# Patient Record
Sex: MALE | Race: WHITE | NOT HISPANIC OR LATINO | Employment: UNEMPLOYED | URBAN - METROPOLITAN AREA
[De-identification: names, ages, dates, MRNs, and addresses within clinical notes are randomized per-mention and may not be internally consistent; named-entity substitution may affect disease eponyms.]

---

## 2017-05-30 ENCOUNTER — AMBULATORY - RIVER FALLS (OUTPATIENT)
Dept: FAMILY MEDICINE | Facility: CLINIC | Age: 52
End: 2017-05-30

## 2017-11-02 ENCOUNTER — OFFICE VISIT - RIVER FALLS (OUTPATIENT)
Dept: FAMILY MEDICINE | Facility: CLINIC | Age: 52
End: 2017-11-02

## 2017-11-02 ASSESSMENT — MIFFLIN-ST. JEOR: SCORE: 1532.32

## 2017-11-20 ENCOUNTER — OFFICE VISIT - RIVER FALLS (OUTPATIENT)
Dept: FAMILY MEDICINE | Facility: CLINIC | Age: 52
End: 2017-11-20

## 2017-11-20 ASSESSMENT — MIFFLIN-ST. JEOR: SCORE: 1520.52

## 2018-03-08 ENCOUNTER — OFFICE VISIT - RIVER FALLS (OUTPATIENT)
Dept: FAMILY MEDICINE | Facility: CLINIC | Age: 53
End: 2018-03-08

## 2018-03-08 ASSESSMENT — MIFFLIN-ST. JEOR: SCORE: 1523.25

## 2019-10-09 ENCOUNTER — OFFICE VISIT - RIVER FALLS (OUTPATIENT)
Dept: FAMILY MEDICINE | Facility: CLINIC | Age: 54
End: 2019-10-09

## 2019-10-18 ENCOUNTER — OFFICE VISIT - RIVER FALLS (OUTPATIENT)
Dept: FAMILY MEDICINE | Facility: CLINIC | Age: 54
End: 2019-10-18

## 2019-10-18 ASSESSMENT — MIFFLIN-ST. JEOR: SCORE: 1449.76

## 2019-11-05 ENCOUNTER — OFFICE VISIT - RIVER FALLS (OUTPATIENT)
Dept: FAMILY MEDICINE | Facility: CLINIC | Age: 54
End: 2019-11-05

## 2019-11-05 ASSESSMENT — MIFFLIN-ST. JEOR: SCORE: 1457.02

## 2019-11-15 ENCOUNTER — COMMUNICATION - RIVER FALLS (OUTPATIENT)
Dept: FAMILY MEDICINE | Facility: CLINIC | Age: 54
End: 2019-11-15

## 2019-12-31 ENCOUNTER — OFFICE VISIT - RIVER FALLS (OUTPATIENT)
Dept: FAMILY MEDICINE | Facility: CLINIC | Age: 54
End: 2019-12-31

## 2019-12-31 ASSESSMENT — MIFFLIN-ST. JEOR: SCORE: 1494.21

## 2020-01-09 ENCOUNTER — OFFICE VISIT - RIVER FALLS (OUTPATIENT)
Dept: FAMILY MEDICINE | Facility: CLINIC | Age: 55
End: 2020-01-09

## 2020-01-09 ASSESSMENT — MIFFLIN-ST. JEOR: SCORE: 1505.1

## 2020-03-05 ENCOUNTER — OFFICE VISIT - RIVER FALLS (OUTPATIENT)
Dept: FAMILY MEDICINE | Facility: CLINIC | Age: 55
End: 2020-03-05

## 2020-03-05 ASSESSMENT — MIFFLIN-ST. JEOR: SCORE: 1523.25

## 2020-03-06 ENCOUNTER — COMMUNICATION - RIVER FALLS (OUTPATIENT)
Dept: FAMILY MEDICINE | Facility: CLINIC | Age: 55
End: 2020-03-06

## 2020-03-06 LAB
BUN SERPL-MCNC: 16 MG/DL (ref 7–25)
BUN/CREAT RATIO - HISTORICAL: ABNORMAL (ref 6–22)
CALCIUM SERPL-MCNC: 9.8 MG/DL (ref 8.6–10.3)
CHLORIDE BLD-SCNC: 103 MMOL/L (ref 98–110)
CO2 SERPL-SCNC: 31 MMOL/L (ref 20–32)
CREAT SERPL-MCNC: 0.77 MG/DL (ref 0.7–1.33)
EGFRCR SERPLBLD CKD-EPI 2021: 104 ML/MIN/1.73M2
GLUCOSE BLD-MCNC: 121 MG/DL (ref 65–99)
POTASSIUM BLD-SCNC: 4.7 MMOL/L (ref 3.5–5.3)
SODIUM SERPL-SCNC: 141 MMOL/L (ref 135–146)

## 2020-05-14 ENCOUNTER — OFFICE VISIT - RIVER FALLS (OUTPATIENT)
Dept: FAMILY MEDICINE | Facility: CLINIC | Age: 55
End: 2020-05-14

## 2020-05-15 ENCOUNTER — COMMUNICATION - RIVER FALLS (OUTPATIENT)
Dept: FAMILY MEDICINE | Facility: CLINIC | Age: 55
End: 2020-05-15

## 2020-09-08 ENCOUNTER — COMMUNICATION - RIVER FALLS (OUTPATIENT)
Dept: FAMILY MEDICINE | Facility: CLINIC | Age: 55
End: 2020-09-08

## 2020-09-11 ENCOUNTER — OFFICE VISIT - RIVER FALLS (OUTPATIENT)
Dept: FAMILY MEDICINE | Facility: CLINIC | Age: 55
End: 2020-09-11

## 2020-10-23 ENCOUNTER — OFFICE VISIT - RIVER FALLS (OUTPATIENT)
Dept: FAMILY MEDICINE | Facility: CLINIC | Age: 55
End: 2020-10-23

## 2020-10-23 ASSESSMENT — MIFFLIN-ST. JEOR: SCORE: 1527.78

## 2020-11-23 ENCOUNTER — COMMUNICATION - RIVER FALLS (OUTPATIENT)
Dept: FAMILY MEDICINE | Facility: CLINIC | Age: 55
End: 2020-11-23

## 2021-02-04 ENCOUNTER — OFFICE VISIT - RIVER FALLS (OUTPATIENT)
Dept: FAMILY MEDICINE | Facility: CLINIC | Age: 56
End: 2021-02-04

## 2021-02-18 ENCOUNTER — OFFICE VISIT - RIVER FALLS (OUTPATIENT)
Dept: FAMILY MEDICINE | Facility: CLINIC | Age: 56
End: 2021-02-18

## 2021-02-18 ASSESSMENT — MIFFLIN-ST. JEOR: SCORE: 1536.85

## 2021-02-19 ENCOUNTER — COMMUNICATION - RIVER FALLS (OUTPATIENT)
Dept: FAMILY MEDICINE | Facility: CLINIC | Age: 56
End: 2021-02-19

## 2021-02-19 LAB
BUN SERPL-MCNC: 23 MG/DL (ref 7–25)
BUN/CREAT RATIO - HISTORICAL: NORMAL (ref 6–22)
CALCIUM SERPL-MCNC: 9.3 MG/DL (ref 8.6–10.3)
CHLORIDE BLD-SCNC: 104 MMOL/L (ref 98–110)
CO2 SERPL-SCNC: 29 MMOL/L (ref 20–32)
CREAT SERPL-MCNC: 1.11 MG/DL (ref 0.7–1.33)
EGFRCR SERPLBLD CKD-EPI 2021: 75 ML/MIN/1.73M2
GLUCOSE BLD-MCNC: 89 MG/DL (ref 65–99)
POTASSIUM BLD-SCNC: 4.9 MMOL/L (ref 3.5–5.3)
SODIUM SERPL-SCNC: 139 MMOL/L (ref 135–146)

## 2021-06-18 ENCOUNTER — OFFICE VISIT - RIVER FALLS (OUTPATIENT)
Dept: FAMILY MEDICINE | Facility: CLINIC | Age: 56
End: 2021-06-18

## 2021-06-18 ASSESSMENT — MIFFLIN-ST. JEOR: SCORE: 1527.78

## 2021-10-12 ENCOUNTER — OFFICE VISIT - RIVER FALLS (OUTPATIENT)
Dept: FAMILY MEDICINE | Facility: CLINIC | Age: 56
End: 2021-10-12

## 2021-10-22 ENCOUNTER — OFFICE VISIT - RIVER FALLS (OUTPATIENT)
Dept: FAMILY MEDICINE | Facility: CLINIC | Age: 56
End: 2021-10-22

## 2021-10-22 ASSESSMENT — MIFFLIN-ST. JEOR: SCORE: 1547.29

## 2022-01-28 ENCOUNTER — OFFICE VISIT - RIVER FALLS (OUTPATIENT)
Dept: FAMILY MEDICINE | Facility: CLINIC | Age: 57
End: 2022-01-28

## 2022-02-11 VITALS
RESPIRATION RATE: 16 BRPM | SYSTOLIC BLOOD PRESSURE: 136 MMHG | HEIGHT: 66 IN | HEART RATE: 100 BPM | DIASTOLIC BLOOD PRESSURE: 84 MMHG | TEMPERATURE: 98 F | WEIGHT: 168 LBS | BODY MASS INDEX: 27 KG/M2

## 2022-02-12 VITALS
SYSTOLIC BLOOD PRESSURE: 142 MMHG | SYSTOLIC BLOOD PRESSURE: 128 MMHG | HEIGHT: 66 IN | WEIGHT: 148.8 LBS | OXYGEN SATURATION: 98 % | HEART RATE: 102 BPM | DIASTOLIC BLOOD PRESSURE: 90 MMHG | TEMPERATURE: 98.9 F | DIASTOLIC BLOOD PRESSURE: 80 MMHG | DIASTOLIC BLOOD PRESSURE: 80 MMHG | HEART RATE: 100 BPM | WEIGHT: 147.6 LBS | OXYGEN SATURATION: 98 % | BODY MASS INDEX: 23.91 KG/M2 | BODY MASS INDEX: 24.17 KG/M2 | HEART RATE: 88 BPM | HEIGHT: 66 IN | WEIGHT: 150.4 LBS | SYSTOLIC BLOOD PRESSURE: 160 MMHG | BODY MASS INDEX: 24.19 KG/M2

## 2022-02-12 VITALS
SYSTOLIC BLOOD PRESSURE: 136 MMHG | HEART RATE: 100 BPM | BODY MASS INDEX: 27.04 KG/M2 | WEIGHT: 165 LBS | TEMPERATURE: 97 F | OXYGEN SATURATION: 99 % | HEIGHT: 66 IN | BODY MASS INDEX: 26.52 KG/M2 | HEIGHT: 66 IN | DIASTOLIC BLOOD PRESSURE: 84 MMHG

## 2022-02-12 VITALS
DIASTOLIC BLOOD PRESSURE: 84 MMHG | BODY MASS INDEX: 26.42 KG/M2 | OXYGEN SATURATION: 97 % | HEART RATE: 116 BPM | SYSTOLIC BLOOD PRESSURE: 152 MMHG | BODY MASS INDEX: 26.84 KG/M2 | SYSTOLIC BLOOD PRESSURE: 148 MMHG | HEART RATE: 108 BPM | HEIGHT: 66 IN | DIASTOLIC BLOOD PRESSURE: 90 MMHG | HEIGHT: 66 IN | TEMPERATURE: 98.4 F | OXYGEN SATURATION: 96 % | WEIGHT: 164.4 LBS | WEIGHT: 167 LBS

## 2022-02-12 VITALS
BODY MASS INDEX: 25.88 KG/M2 | TEMPERATURE: 97.7 F | BODY MASS INDEX: 25.49 KG/M2 | HEIGHT: 66 IN | WEIGHT: 161 LBS | TEMPERATURE: 98.8 F | DIASTOLIC BLOOD PRESSURE: 90 MMHG | WEIGHT: 158.6 LBS | DIASTOLIC BLOOD PRESSURE: 86 MMHG | SYSTOLIC BLOOD PRESSURE: 148 MMHG | HEART RATE: 100 BPM | HEART RATE: 77 BPM | SYSTOLIC BLOOD PRESSURE: 150 MMHG | OXYGEN SATURATION: 99 % | HEIGHT: 66 IN

## 2022-02-12 VITALS
DIASTOLIC BLOOD PRESSURE: 84 MMHG | BODY MASS INDEX: 27.37 KG/M2 | HEIGHT: 66 IN | WEIGHT: 170.3 LBS | SYSTOLIC BLOOD PRESSURE: 136 MMHG

## 2022-02-12 VITALS
HEART RATE: 93 BPM | TEMPERATURE: 97.9 F | HEIGHT: 66 IN | OXYGEN SATURATION: 98 % | WEIGHT: 166 LBS | BODY MASS INDEX: 26.68 KG/M2 | SYSTOLIC BLOOD PRESSURE: 136 MMHG | DIASTOLIC BLOOD PRESSURE: 84 MMHG

## 2022-02-12 VITALS
WEIGHT: 166 LBS | HEIGHT: 66 IN | BODY MASS INDEX: 26.68 KG/M2 | OXYGEN SATURATION: 98 % | DIASTOLIC BLOOD PRESSURE: 84 MMHG | BODY MASS INDEX: 26.55 KG/M2 | SYSTOLIC BLOOD PRESSURE: 136 MMHG | HEART RATE: 105 BPM | TEMPERATURE: 97.7 F | DIASTOLIC BLOOD PRESSURE: 82 MMHG | SYSTOLIC BLOOD PRESSURE: 136 MMHG | TEMPERATURE: 97.8 F | WEIGHT: 162 LBS | HEART RATE: 88 BPM

## 2022-02-12 VITALS
SYSTOLIC BLOOD PRESSURE: 164 MMHG | HEIGHT: 66 IN | TEMPERATURE: 97.6 F | BODY MASS INDEX: 26.52 KG/M2 | HEART RATE: 108 BPM | DIASTOLIC BLOOD PRESSURE: 92 MMHG | WEIGHT: 165 LBS

## 2022-02-15 NOTE — TELEPHONE ENCOUNTER
---------------------  From: Isabell Bronson CMA (eRx Pool ([id number] [facility] [place]))   To: Jose Dela Cruz MD;     Sent: 8/7/2020 2:45:29 PM CDT  Subject: FW: Medication Management   Due Date/Time: 8/8/2020 2:43:00 PM CDT           ------------------------------------------  From: [facility]  To: Jose Dela Cruz MD  Sent: August 7, 2020 2:43:51 PM CDT  Subject: Medication Management  Due: July 28, 2020 10:29:52 PM CDT     ** On Hold Pending Signature **     Drug: ibuprofen (ibuprofen 800 mg oral tablet), TAKE ONE TABLET BY MOUTH EVERY 8 HOURS AS DIRECTED BY PROVIDER  Quantity: 90 unknown unit  Days Supply: 30  Refills: 1  Substitutions Allowed  Notes from Pharmacy:     Dispensed Drug: ibuprofen (ibuprofen 800 mg oral tablet), TAKE ONE TABLET BY MOUTH EVERY 8 HOURS AS DIRECTED BY PROVIDER  Quantity: 90 unknown unit  Days Supply: 30  Refills: 1  Substitutions Allowed  Notes from Pharmacy:  ---------------------------------------------------------------  From: Jose Dela Cruz MD   To: [facility]    Sent: 8/7/2020 3:44:38 PM CDT  Subject: FW: Medication Management     ** Approved with modifications: **  ibuprofen (IBUPROFEN 800MG TABS)  TAKE ONE TABLET BY MOUTH EVERY 8 HOURS AS DIRECTED BY PROVIDER  Qty:  90 unknown unit        Days Supply:  30        Refills:  1          Substitutions Allowed     Route To Pharmacy - [facility]

## 2022-02-15 NOTE — PROGRESS NOTES
Patient:   DERICK ADAMS            MRN: [id number]            FIN: [id number]               Age:   52 years     Sex:  Male     :  1965   Associated Diagnoses:   None   Author:   Jose Dela Cruz MD       -   Today's date:  3/8/2018 4:09:00 PM .        -   To whom it may concern:        This patient is currently under my care and Was seen in my office on  3/8/2018.  .     Please excuse him/ her from work, until back further evaluated by orthopedics.  Please contact me if you have any questions or concerns.      -   Sincerely,             Jose Dela Cruz MD  [address]  [id number] [phone]

## 2022-02-15 NOTE — TELEPHONE ENCOUNTER
---------------------  From: Phyllis Posada CMA   Sent: 2/4/2021 2:38:41 PM CST  Subject: Tele/Today's appt     Attempted to call patient w/ no answer to see if he would like to discuss chronic pain over the phone instead of face to face due to weather.

## 2022-02-15 NOTE — TELEPHONE ENCOUNTER
---------------------  From: Iraida Phillip RN (Phone Messages Pool ([id number]))   To: [facility] Message Pool ([id number]);     Sent: 9/8/2020 10:44:07 AM CDT  Subject: LTD forms     Phone message    PCP:   CHT      Time of Call:  1025       Person Calling:  Female  Phone number:  [phone]    Returned call at: _    Note:    states T was supposed to be getting LTD forms to fill out and fax back. They are wondering if this has been done.    Please advise.    Last office visit and reason:  5/14/20, low back pain---------------------  From: Phyllis Posada CMA (T Message Pool ([id number]))   To: Jose Dela Cruz MD;     Sent: 9/8/2020 11:29:04 AM CDT  Subject: FW: LTD forms---------------------  From: Jose Dela Cruz MD   To: [facility] Message Pool ([id number]_[facility]);     Sent: 9/8/2020 11:30:17 AM CDT  Subject: RE: LTD forms     Do not have and will require a visit to fill out forms.Patient informed to obtain a copy of ppwk and make appt for completion. Pt agreed and will call back

## 2022-02-15 NOTE — NURSING NOTE
Comprehensive Intake Entered On:  6/18/2021 11:32 AM CDT    Performed On:  6/18/2021 11:29 AM CDT by Olivia Razo CMA               Summary   Chief Complaint :   Low back pain f/u. Needs Oxycodone refill.    Menstrual Status :   N/A   Weight Measured :   166 lb(Converted to: 166 lb 0 oz, 75.296 kg)    Height Measured :   65.5 in(Converted to: 5 ft 5 in, 166.37 cm)    Body Mass Index :   27.2 kg/m2 (HI)    Body Surface Area :   1.86 m2   Height/Length Estimated :   65.5 in(Converted to: 5 ft 5 in, 166.37 cm)    Systolic Blood Pressure :   128 mmHg   Diastolic Blood Pressure :   80 mmHg   Mean Arterial Pressure :   96 mmHg   Peripheral Pulse Rate :   93 bpm   BP Site :   Right arm   BP Method :   Electronic   Temperature Oral :   97.9 DegF(Converted to: 36.6 DegC)    Oxygen Saturation :   98 %   Olivia Razo CMA - 6/18/2021 11:29 AM CDT   Health Status   Allergies Verified? :   Yes   Medication History Verified? :   Yes   Pre-Visit Planning Status :   Completed   Tobacco Use? :   Current every day smoker   Olivia Razo CMA - 6/18/2021 11:29 AM CDT   Consents   Consent for Immunization Exchange :   Consent Granted   Consent for Immunizations to Providers :   Consent Granted   Olivia Razo CMA - 6/18/2021 11:29 AM CDT   Meds / Allergies   (As Of: 6/18/2021 11:32:43 AM CDT)   Allergies (Active)   penicillin  Estimated Onset Date:   Unspecified ; Created By:   Michelle Bonilla; Reaction Status:   Active ; Category:   Drug ; Substance:   penicillin ; Type:   Allergy ; Updated By:   Michelle Bonilla; Reviewed Date:   6/18/2021 11:30 AM CDT        Medication List   (As Of: 6/18/2021 11:32:43 AM CDT)   Prescription/Discharge Order    baclofen  :   baclofen ; Status:   Prescribed ; Ordered As Mnemonic:   baclofen 10 mg oral tablet ; Simple Display Line:   10 mg, 1 tab(s), Oral, tid, PRN: as needed for muscle spasm, 90 tab(s), 1 Refill(s) ; Ordering Provider:   Jose Dela Cruz MD; Catalog Code:   baclofen ; Order Dt/Tm:    5/15/2020 4:06:42 PM CDT          ibuprofen  :   ibuprofen ; Status:   Prescribed ; Ordered As Mnemonic:   ibuprofen 800 mg oral tablet ; Simple Display Line:   1 tab(s), Oral, q8 hrs, for 30 day(s), AS DIRECTED BY PROVIDER., 90 tab(s), 5 Refill(s) ; Ordering Provider:   Jose Dela Cruz MD; Catalog Code:   ibuprofen ; Order Dt/Tm:   2/18/2021 4:49:48 PM CST          oxyCODONE  :   oxyCODONE ; Status:   Prescribed ; Ordered As Mnemonic:   oxyCODONE 5 mg oral tablet ; Simple Display Line:   5 mg, 1 tab(s), Oral, q12 hrs, 15 tab(s), 0 Refill(s) ; Ordering Provider:   Jose Dela Cruz MD; Catalog Code:   oxyCODONE ; Order Dt/Tm:   2/18/2021 4:46:48 PM CST

## 2022-02-15 NOTE — PROGRESS NOTES
Patient:   DERICK ADAMS            MRN: [id number]            FIN: [id number]               Age:   52 years     Sex:  Male     :  1965   Associated Diagnoses:   Lumbar compression fracture   Author:   Narendra Cisneros PA-C      Report Summary   Diagnosis  Lumbar compression fracture (VBC04-AX S32.040A).  Patient InstructionsOrders   Visit Information      Date of Service: 2017 07:38 am  Performing Location: [facility]  Encounter#: [id number]      Primary Care Provider (PCP):  Jose Dela Cruz MD    NPI# [id number]      Chief Complaint   2017 8:03 AM CST   Patient here for ER follow up. Patient in ER over the weekend, and has a fractured L4. Pain meds prescribed not helping.        History of Present Illness             The patient presents with back pain.  The back pain is localized and lumbar region.  The back pain is described as burning.  The severity of the back pain is moderate.  The back pain is constant.  The back pain has lasted for 2 day(s).  Jumped off porch of [employer] building and hurt lower back. Was to [facility] ED and brings in records. Has L4 compression fracture. ?s about follow up. Has tramadol which isn't helping with pain. PDMP checked. Recent negative drug screen. CC above noted and confirmed with the patient. No bowel or bladder complaints. Numbness in right leg..        Review of Systems   Constitutional:  Negative.    Musculoskeletal:  Negative except as documented in history of present illness.    Integumentary:  Negative.    Neurologic:  Negative except as documented in history of present illness.       Health Status   Allergies:    Allergic Reactions (All)  Severity Not Documented  Penicillin (No reactions were documented)   Medications:  (Selected)   Prescriptions  Prescribed  Tylenol with Codeine #3 oral tablet: 1 tab(s), PO, q4hr, PRN: for pain, # 18 tab(s), 0 Refill(s), Type: Maintenance, Pharmacy: [facility], 1 tab(s) po q4 hrs,PRN:for  pain  Documented Medications  Documented  traMADol 50 mg oral tablet: 1 tab(s) ( 50 mg ), PO, q4hr, PRN: for pain, # 60 tab(s), 0 Refill(s), Type: Maintenance   Problem list:    All Problems  Tobacco abuse / SNOMED CT [id number] / Confirmed      Histories   Past Medical History:    Active  Tobacco abuse ([id number])   Family History:    No family history items have been selected or recorded.   Procedure history:    No active procedure history items have been selected or recorded.   Social History:        Alcohol Assessment: Denies Alcohol Use            Never      Tobacco Assessment: Current            Cigarettes, 20 per day.      Substance Abuse Assessment: Denies Substance Abuse            Never      Employment and Education Assessment            Work/School description: Construction.      Home and Environment Assessment            Marital status: Single.  Spouse/Partner name: Carito Noland.      Nutrition and Health Assessment            Type of diet: Regular.      Exercise and Physical Activity Assessment: Does not exercise            Exercise frequency: Physical job.      Sexual Assessment            Sexually active: Yes.  Sexual orientation: Heterosexual.        Physical Examination   Vital Signs   11/20/2017 8:03 AM CST Peripheral Pulse Rate 108 bpm  HI    Systolic Blood Pressure 152 mmHg  HI    Diastolic Blood Pressure 90 mmHg    Mean Arterial Pressure 111 mmHg    Oxygen Saturation 96 %      General:  Mild distress.    Respiratory:  Respirations are non-labored.    Cardiovascular:  Normal rate.    Musculoskeletal:  Normal gait.    Integumentary:  No rash.    Neurologic:  No focal deficits.       Impression and Plan   Diagnosis     Lumbar compression fracture ([id number]-NU S32.040A).     Patient Instructions:       Counseled: Patient, Regarding diagnosis, Regarding medications, Activity, Verbalized understanding.    Orders     Orders (Selected)   Outpatient Orders  Ordered  Referral (Request): 11/20/17 9:28:00  CST, Referred to: Orthopaedics, Referred to: First available, Reason for referral: lumbar compression fracture, Priority: Soon, Lumbar compression fracture  Prescriptions  Prescribed  Tylenol with Codeine #3 oral tablet: 1 tab(s), PO, q4hr, PRN: for pain, # 18 tab(s), 0 Refill(s), Type: Maintenance, Pharmacy: [facility], 1 tab(s) po q4 hrs,PRN:for pain.     Note given for work.

## 2022-02-15 NOTE — LETTER
(Inserted Image. Unable to display)                                                                                                [address] [facility] [address]                                                March 03, 2021Re: DERICK ADAMS1965Tregavin Emerson MD[facility] - [facility]ist/[address] [place], [place]  [address]Pi: Dr. EmersonThe following patient has been referred to your office/practice:  DERICKANSELMO NUNEZZIER Appointment:  Appointment is PendingLocation:  [place] refer to the attached  clinical documentation for a summary of DERICK's care.  Please do not hesitate to contact our office if any additional clinical questions arise.  All relevant records and transition of care documents should be mailed or faxed.Your assistance in providing continuity of care is appreciated. Sincerely, [facility] [place] [address](P) [phone](F) [phone]

## 2022-02-15 NOTE — NURSING NOTE
Comprehensive Intake Entered On:  10/22/2021 4:19 PM CDT    Performed On:  10/22/2021 4:12 PM CDT by Phyllis Posada CMA               Summary   Chief Complaint :   Low back pain follow up. Medication refills.   Menstrual Status :   N/A   Weight Measured :   170.3 lb(Converted to: 170 lb 5 oz, 77.247 kg)    Height Measured :   65.5 in(Converted to: 5 ft 5 in, 166.37 cm)    Body Mass Index :   27.91 kg/m2 (HI)    Body Surface Area :   1.89 m2   Height/Length Estimated :   65.5 in(Converted to: 5 ft 5 in, 166.37 cm)    Systolic Blood Pressure :   126 mmHg   Diastolic Blood Pressure :   82 mmHg (HI)    Mean Arterial Pressure :   97 mmHg   BP Site :   Right arm   BP Method :   Manual   Phyllis Posada CMA - 10/22/2021 4:12 PM CDT   Health Status   Allergies Verified? :   Yes   Medication History Verified? :   Yes   Medical History Verified? :   Yes   Pre-Visit Planning Status :   Completed   Tobacco Use? :   Current every day smoker   Phyllis Posada CMA - 10/22/2021 4:12 PM CDT   Consents   Consent for Immunization Exchange :   Consent Granted   Consent for Immunizations to Providers :   Consent Granted   Phyllis Posada CMA - 10/22/2021 4:12 PM CDT   Meds / Allergies   (As Of: 10/22/2021 4:20:00 PM CDT)   Allergies (Active)   penicillin  Estimated Onset Date:   Unspecified ; Created By:   Michelle Bonilla; Reaction Status:   Active ; Category:   Drug ; Substance:   penicillin ; Type:   Allergy ; Updated By:   Michelle Bonilla; Reviewed Date:   10/22/2021 4:17 PM CDT        Medication List   (As Of: 10/22/2021 4:20:00 PM CDT)   Prescription/Discharge Order    baclofen  :   baclofen ; Status:   Prescribed ; Ordered As Mnemonic:   baclofen 10 mg oral tablet ; Simple Display Line:   10 mg, 1 tab(s), Oral, tid, PRN: as needed for muscle spasm, 90 tab(s), 1 Refill(s) ; Ordering Provider:   Jose Dela Cruz MD; Catalog Code:   baclofen ; Order Dt/Tm:   6/18/2021 11:52:38 AM CDT          ibuprofen  :   ibuprofen ; Status:    Prescribed ; Ordered As Mnemonic:   ibuprofen 800 mg oral tablet ; Simple Display Line:   1 tab(s), Oral, q8 hrs, for 30 day(s), AS DIRECTED BY PROVIDER., 90 tab(s), 5 Refill(s) ; Ordering Provider:   Jose Dela Cruz MD; Catalog Code:   ibuprofen ; Order Dt/Tm:   6/18/2021 11:52:38 AM CDT          oxyCODONE  :   oxyCODONE ; Status:   Prescribed ; Ordered As Mnemonic:   oxyCODONE 5 mg oral tablet ; Simple Display Line:   5 mg, 1 tab(s), Oral, q12 hrs, 15 tab(s), 0 Refill(s) ; Ordering Provider:   Jose Dela Cruz MD; Catalog Code:   oxyCODONE ; Order Dt/Tm:   6/18/2021 11:52:37 AM CDT

## 2022-02-15 NOTE — NURSING NOTE
Comprehensive Intake Entered On:  9/11/2020 1:06 PM CDT    Performed On:  9/11/2020 1:02 PM CDT by Phyllis Posada CMA               Summary   Chief Complaint :   Disability paperwork per Principal   Menstrual Status :   N/A   Weight Measured :   162 lb(Converted to: 162 lb 0 oz, 73.48 kg)    Height/Length Estimated :   65.5 in(Converted to: 5 ft 5 in, 166.37 cm)    Systolic Blood Pressure :   160 mmHg (HI)    Diastolic Blood Pressure :   96 mmHg (HI)    Mean Arterial Pressure :   117 mmHg   Peripheral Pulse Rate :   88 bpm   BP Site :   Right arm   BP Method :   Electronic   Temperature Tympanic :   97.7 DegF(Converted to: 36.5 DegC)  (LOW)    Phyllis Posada CMA - 9/11/2020 1:02 PM CDT   Health Status   Allergies Verified? :   Yes   Medication History Verified? :   Yes   Medical History Verified? :   Yes   Pre-Visit Planning Status :   Completed   Tobacco Use? :   Current every day smoker   Phyllis Posada CMA - 9/11/2020 1:02 PM CDT   Consents   Consent for Immunization Exchange :   Consent Granted   Consent for Immunizations to Providers :   Consent Granted   Phyllis Posada CMA - 9/11/2020 1:02 PM CDT   Meds / Allergies   (As Of: 9/11/2020 1:06:42 PM CDT)   Allergies (Active)   penicillin  Estimated Onset Date:   Unspecified ; Created By:   Michelle Vázquez; Reaction Status:   Active ; Category:   Drug ; Substance:   penicillin ; Type:   Allergy ; Updated By:   Michelle Vázquez; Reviewed Date:   9/11/2020 1:04 PM CDT        Medication List   (As Of: 9/11/2020 1:06:42 PM CDT)   Prescription/Discharge Order    baclofen  :   baclofen ; Status:   Prescribed ; Ordered As Mnemonic:   baclofen 10 mg oral tablet ; Simple Display Line:   10 mg, 1 tab(s), Oral, tid, PRN: as needed for muscle spasm, 90 tab(s), 1 Refill(s) ; Ordering Provider:   Jose Dela Cruz MD; Catalog Code:   baclofen ; Order Dt/Tm:   5/15/2020 4:06:42 PM CDT          ibuprofen  :   ibuprofen ; Status:   Prescribed ; Ordered As Mnemonic:   ibuprofen 800 mg  oral tablet ; Simple Display Line:   1 tab(s), Oral, q8 hrs, AS DIRECTED BY PROVIDER., 90 unknown unit, 1 Refill(s) ; Ordering Provider:   Jose Dela Cruz MD; Catalog Code:   ibuprofen ; Order Dt/Tm:   8/7/2020 3:44:33 PM CDT            Home Meds    oxyCODONE  :   oxyCODONE ; Status:   Documented ; Ordered As Mnemonic:   oxyCODONE 5 mg oral tablet ; Simple Display Line:   5 mg, 1 tab(s), Oral, q6 hrs, PRN: for pain, 0 Refill(s) ; Catalog Code:   oxyCODONE ; Order Dt/Tm:   9/11/2020 1:04:37 PM CDT            ID Risk Screen   Recent Travel History :   No recent travel   Family Member Travel History :   No recent travel   Other Exposure to Infectious Disease :   Unknown   Phyllis Posada CMA - 9/11/2020 1:02 PM CDT   More Vitals   Systolic Blood Pressure Repeat :   136 mmHg   Diastolic Blood Pressure Repeat :   82 mmHg   Phyllis Posada CMA - 9/11/2020 2:06 PM CDT

## 2022-02-15 NOTE — PROGRESS NOTES
Chief Complaint    Pt c/o left ear pain.  History of Present Illness      Chief complaint as above reviewed and confirmed with patient.  Pt presents to the clinic with concerns re: L otorrhea and ear discomfort.  tobacco using male, has a hx of ear infections, PE tubes as a child and adult. Has been several years since last set.  Has several episodes of otorrhea and ear pain per year he does not seek medical care for, however this one is worse.  No fevers.  Does not recall last time someone looked in his ear when he did not have pain or otorrhea. no fever or uri recently.  Review of Systems      Review of systems is negative with the exception of those noted in HPI          Physical Exam   Vitals & Measurements    T: 97.8  F (Tympanic)  HR: 105 (Peripheral)  BP: 136/82  SpO2: 98%     HT: 65.5 in  HT: 65.5 in  WT: 166 lb  BMI: 27.2       nad appears well.  exam of the L ear revals thin purulent otorrhea present. unable to see TM.  No blood.       R TM intact, retracted and with purulent air fluid level and mild erythema.       neck supple no adenopathy  Assessment/Plan       1. Tobacco abuse (Z72.0)         cessation recommended       2. Otitis media of right ear (H66.91)         cefdinir as ordered given allergies       3. Otorrhea, left ear (H92.12)         floxin as ordered.  recheck in 3 -4 weeks.  If persistent consider ENT referral given history and tobacco use.       Orders:         cefdinir, = 1 cap(s) ( 300 mg ), Oral, q12 hrs, x 10 day(s), # 20 cap(s), 0 Refill(s), Type: Acute, Pharmacy: [facility], 1 cap(s) Oral q12 hrs,x10 day(s), 65.5, in, 10/23/20 13:05:00 CDT, Height Measured, 166, lb, 10/23/20 13:05:00 CDT..., (Ordered)         ofloxacin otic, 5 drop(s), Ear-Left, bid, # 10 mL, 0 Refill(s), Type: Acute, Pharmacy: [facility], 5 drop(s) Ear-Left bid, 65.5, in, 10/23/20 13:05:00 CDT, Height Measured, 166, lb, 10/23/20 13:05:00 CDT, Weight Measured,  (Ordered)  Patient Information     Name:DERICK ADAMS      Address:      [address] [place] [phone]     Sex:Male     YOB: 1965     [phone]     Emergency Contact:CARITO CARTER     MRN:[id number]     FIN:[id number]     Location:[facility]     Date of Service:10/23/2020      Primary Care Physician:       Jose Dela Cruz MD, [phone]      Attending Physician:       Willow ROCHA, Vesna COLEMAN, [phone]  Problem List/Past Medical History    Ongoing     Tobacco abuse    Historical     No qualifying data  Medications    baclofen 10 mg oral tablet, 10 mg= 1 tab(s), Oral, tid, PRN, 1 refills    cefdinir 300 mg oral capsule, 300 mg= 1 cap(s), Oral, q12 hrs    Floxin Otic 0.3% otic solution, 5 drop(s), Ear-Left, bid    ibuprofen 800 mg oral tablet, 1 tab(s), Oral, q8 hrs    oxyCODONE 5 mg oral capsule, 5 mg= 1 cap(s), Oral, q4 hrs  Allergies    penicillin  Social History    Smoking Status     Never smoker     Alcohol - Denies Alcohol Use      Never     Employment/School      Work/School description: Construction.     Exercise - Does not exercise      Exercise frequency: Physical job.     Home/Environment      Marital status: Single. Spouse/Partner name: Carito Noland.     Nutrition/Health      Type of diet: Regular.     Sexual      Sexually active: Yes. Sexual orientation: Heterosexual.     Substance Abuse - Denies Substance Abuse      Never     Tobacco - Current      Cigarettes, 20 per day.

## 2022-02-15 NOTE — TELEPHONE ENCOUNTER
Per patient needs to be re-referred for spine, nothing scheduled after referral sent in May. Referral infomation faxed to [facility], LM for patient that they will contact him to schedule and left my number to call if does not hear from them within a week.Communication received from [facility] stating patient has not returned calls attempting to arrange referral.

## 2022-02-15 NOTE — PROGRESS NOTES
Patient:   DERICK ADAMS            MRN: [id number]            FIN: [id number]               Age:   55 years     Sex:  Male     :  1965   Associated Diagnoses:   Low back pain   Author:   Jose Dela Cruz MD      Visit Information      Date of Service: 2021 11:20 am  Performing Location: [facility]  Encounter#: [id number]      Primary Care Provider (PCP):  Jose Dela Cruz MD    NPI# [id number]      Referring Provider:  Jose Dela Cruz MD    NPI# [id number]      Chief Complaint   2021 11:29 AM CDT   Low back pain f/u. Needs Oxycodone refill.   Chief complaint and symptoms noted above confirmed with patient.      Review of Systems   Constitutional:  Negative.    Musculoskeletal:  Negative except as documented in history of present illness.       Health Status   Allergies:    Allergic Reactions (Selected)  Severity Not Documented  Penicillin (No reactions were documented)   Medications:  (Selected)   Prescriptions  Prescribed  baclofen 10 mg oral tablet: = 1 tab(s) ( 10 mg ), Oral, tid, PRN: as needed for muscle spasm, # 90 tab(s), 1 Refill(s), Type: Maintenance, Pharmacy: [facility], 1 tab(s) Oral tid,PRN:as needed for muscle spasm, 65.5, in, 21 11:29:00 CDT, Height Kia...  ibuprofen 800 mg oral tablet: = 1 tab(s), Oral, q8 hrs, x 30 day(s), Instructions: AS DIRECTED BY PROVIDER., # 90 tab(s), 5 Refill(s), Type: Acute, Pharmacy: [facility], 1 tab(s) Oral q8 hrs,x30 day(s),Instr:AS DIRECTED BY PROVIDER., 65.5, in, 21 11:...  oxyCODONE 5 mg oral tablet: = 1 tab(s) ( 5 mg ), Oral, q12 hrs, # 15 tab(s), 0 Refill(s), Type: Acute, Pharmacy: [facility], 1 tab(s) Oral q12 hrs, 65.5, in, 21 11:29:00 CDT, Height Measured, 166, lb, 21 11:29:00 CDT, Weight Measured   Problem list:    All Problems  Tobacco abuse / SNOMED CT 039704485 / Confirmed      Histories   Past Medical  History:    Active  Tobacco abuse (SNOMED CT 113011353)   Family History:    No family history items have been selected or recorded.   Procedure history:    Epidural steroid injection (SNOMED CT 558120586) performed by Oj Paris MD on 3/6/2020 at 54 Years.  Comments:  12/30/2020 10:11 AM JENNIFER - Latesha Fabian  Bilateral L5/S1  Epidural steroid injection (SNOMED CT 245946074) performed by Davis Paris MD on 2/14/2020 at 54 Years.  Comments:  12/30/2020 10:08 AM CST - Latesha Fabian  Bilateral L4/5   Social History:        Electronic Cigarette/Vaping Assessment            Electronic Cigarette Use: Never.      Alcohol Assessment: Denies Alcohol Use            Never      Tobacco Assessment: Current            10 or more cigarettes (1/2 pack or more)/day in last 30 days, Cigarettes, 20 per day.      Substance Abuse Assessment: Denies Substance Abuse            Never      Employment and Education Assessment            Work/School description: Construction.      Home and Environment Assessment            Marital status: Single.  Spouse/Partner name: Carito Noland.      Nutrition and Health Assessment            Type of diet: Regular.      Exercise and Physical Activity Assessment: Does not exercise            Exercise frequency: Physical job.      Sexual Assessment            Sexually active: Yes.  Sexual orientation: Heterosexual.        Physical Examination   Vital Signs   6/18/2021 11:29 AM CDT Temperature Oral 97.9 DegF    Peripheral Pulse Rate 93 bpm    Systolic Blood Pressure 128 mmHg    Diastolic Blood Pressure 80 mmHg    Mean Arterial Pressure 96 mmHg    BP Site Right arm    BP Method Electronic    Oxygen Saturation 98 %      Documented vital signs:       Blood Pressure: Systolic  136  mmHg, Diastolic  84  mmHg.    General:  Alert and oriented, No acute distress.    Respiratory:  Lungs are clear to auscultation, Respirations are non-labored.    Cardiovascular:  Normal rate, Regular rhythm.     Musculoskeletal:       Spine/torso exam: Lumbar ( Tenderness, Pain, No numbness, No tingling, Normal range of motion ).    Integumentary:  Warm, Dry, Pink.       Review / Management   Results review:  Lab results   2/18/2021 5:05 PM CST Sodium Level 139 mmol/L    Potassium Level 4.9 mmol/L    Chloride Level 104 mmol/L    CO2 Level 29 mmol/L    Glucose Level 89 mg/dL    BUN 23 mg/dL    Creatinine Level 1.11 mg/dL    eGFR 75 mL/min/1.73m2    Calcium Level 9.3 mg/dL   .       Impression and Plan   Diagnosis     Low back pain (DIN10-FQ M54.5).     Course:  Progressing as expected.    Plan:       Refer to: Orthopedics, Home PT.    Patient Instructions:       Counseled: Patient, Friend.    Orders     Orders (Selected)   Outpatient Orders  Order  Return to Clinic (Request): Return in 3 months  Prescriptions  Prescribed  baclofen 10 mg oral tablet: = 1 tab(s) ( 10 mg ), Oral, tid, PRN: as needed for muscle spasm, # 90 tab(s), 1 Refill(s), Type: Maintenance, Pharmacy: [facility], 1 tab(s) Oral tid,PRN:as needed for muscle spasm, 65.5, in, 06/18/21 11:29:00 CDT, Height Kia...  ibuprofen 800 mg oral tablet: = 1 tab(s), Oral, q8 hrs, x 30 day(s), Instructions: AS DIRECTED BY PROVIDER., # 90 tab(s), 5 Refill(s), Type: Acute, Pharmacy: [facility], 1 tab(s) Oral q8 hrs,x30 day(s),Instr:AS DIRECTED BY PROVIDER., 65.5, in, 06/18/21 11:...  oxyCODONE 5 mg oral tablet: = 1 tab(s) ( 5 mg ), Oral, q12 hrs, # 15 tab(s), 0 Refill(s), Type: Acute, Pharmacy: [facility], 1 tab(s) Oral q12 hrs, 65.5, in, 06/18/21 11:29:00 CDT, Height Measured, 166, lb, 06/18/21 11:29:00 CDT, Weight Measured.     [place] Prescription Drug Monitoring Program checked and reviewed.           Professional Services   Counseling Summary:  This was a 30 minute visit with greater than 50% of that time spent counseling the patient, and coordination of care..    Counseling included treatment  options, risks and benefits, lifestyle changes, prognosis, current condition, medications, and dose adjustments.    The patient was interactive, attentive, asked questions, and verbalized understanding.

## 2022-02-15 NOTE — NURSING NOTE
Comprehensive Intake Entered On:  10/23/2020 1:09 PM CDT    Performed On:  10/23/2020 1:05 PM CDT by Phyllis Cooney               Summary   Chief Complaint :   Pt c/o left ear pain.    Menstrual Status :   N/A   Weight Measured :   166 lb(Converted to: 166 lb 0 oz, 75.296 kg)    Height Measured :   65.5 in(Converted to: 5 ft 5 in, 166.37 cm)    Body Mass Index :   27.2 kg/m2 (HI)    Body Surface Area :   1.86 m2   Height/Length Estimated :   65.5 in(Converted to: 5 ft 5 in, 166.37 cm)    Systolic Blood Pressure :   136 mmHg (HI)    Diastolic Blood Pressure :   82 mmHg (HI)    Mean Arterial Pressure :   100 mmHg   Peripheral Pulse Rate :   105 bpm (HI)    Temperature Tympanic :   97.8 DegF(Converted to: 36.6 DegC)  (LOW)    Oxygen Saturation :   98 %   Phyllis Cooney - 10/23/2020 1:05 PM CDT   Health Status   Allergies Verified? :   Yes   Medication History Verified? :   Yes   Medical History Verified? :   Yes   Pre-Visit Planning Status :   Completed   Tobacco Use? :   Never smoker   Phyllis Cooney - 10/23/2020 1:05 PM CDT   Meds / Allergies   (As Of: 10/23/2020 1:09:53 PM CDT)   Allergies (Active)   penicillin  Estimated Onset Date:   Unspecified ; Created By:   Michelle Bonilla; Reaction Status:   Active ; Category:   Drug ; Substance:   penicillin ; Type:   Allergy ; Updated By:   Michelle Bonilla; Reviewed Date:   10/23/2020 1:07 PM CDT        Medication List   (As Of: 10/23/2020 1:09:53 PM CDT)   Prescription/Discharge Order    oxyCODONE  :   oxyCODONE ; Status:   Prescribed ; Ordered As Mnemonic:   oxyCODONE 5 mg oral capsule ; Simple Display Line:   5 mg, 1 cap(s), Oral, q4 hrs, 15 cap(s), 0 Refill(s) ; Ordering Provider:   Jose Dela Cruz MD; Catalog Code:   oxyCODONE ; Order Dt/Tm:   9/11/2020 1:32:52 PM CDT          baclofen  :   baclofen ; Status:   Prescribed ; Ordered As Mnemonic:   baclofen 10 mg oral tablet ; Simple Display Line:   10 mg, 1 tab(s), Oral, tid, PRN: as needed for  muscle spasm, 90 tab(s), 1 Refill(s) ; Ordering Provider:   Jose Dela Cruz MD; Catalog Code:   baclofen ; Order Dt/Tm:   5/15/2020 4:06:42 PM CDT          ibuprofen  :   ibuprofen ; Status:   Prescribed ; Ordered As Mnemonic:   ibuprofen 800 mg oral tablet ; Simple Display Line:   1 tab(s), Oral, q8 hrs, AS DIRECTED BY PROVIDER., 90 unknown unit, 1 Refill(s) ; Ordering Provider:   Jose Dela Cruz MD; Catalog Code:   ibuprofen ; Order Dt/Tm:   8/7/2020 3:44:33 PM CDT            ID Risk Screen   Recent Travel History :   No recent travel   Family Member Travel History :   No recent travel   Other Exposure to Infectious Disease :   Unknown   Phyllis Cooney - 10/23/2020 1:05 PM CDT

## 2022-02-15 NOTE — PROGRESS NOTES
Patient:   DERICK ADAMS            MRN: [id number]            FIN: [id number]               Age:   56 years     Sex:  Male     :  1965   Associated Diagnoses:   Low back pain   Author:   Jose Dela Cruz MD      Visit Information      Date of Service: 10/22/2021 04:09 pm  Performing Location: [facility]  Encounter#: [id number]      Primary Care Provider (PCP):  Jose Dela Cruz MD    NPI# [id number]      Referring Provider:  Jose Dela Cruz MD    NPI# [id number]      Chief Complaint   10/22/2021 4:12 PM CDT   Low back pain follow up. Medication refills.     Chief complaint and symptoms noted above confirmed with patient.      History of Present Illness             The patient presents with back pain.  The back pain is lumbar region.  The back pain is described as aching.  The severity of the back pain is moderate.  The back pain is constant.  Exacerbating factors consist of movement.  Relieving factors consist of analgesics.        Review of Systems   Constitutional:  Negative.    Musculoskeletal:  Negative except as documented in history of present illness.       Health Status   Allergies:    Allergic Reactions (Selected)  Severity Not Documented  Penicillin (No reactions were documented)   Medications:  (Selected)   Prescriptions  Prescribed  baclofen 10 mg oral tablet: = 1 tab(s) ( 10 mg ), Oral, tid, PRN: as needed for muscle spasm, # 90 tab(s), 1 Refill(s), Type: Maintenance, Pharmacy: [facility], 1 tab(s) Oral tid,PRN:as needed for muscle spasm, 65.5, in, 21 11:29:00 CDT, Height Kia...  ibuprofen 800 mg oral tablet: = 1 tab(s), Oral, q8 hrs, x 30 day(s), Instructions: AS DIRECTED BY PROVIDER., # 90 tab(s), 5 Refill(s), Type: Acute, Pharmacy: [facility], 1 tab(s) Oral q8 hrs,x30 day(s),Instr:AS DIRECTED BY PROVIDER., 65.5, in, 21 11:...  oxyCODONE 5 mg oral tablet: = 1 tab(s) ( 5 mg ), Oral, q12 hrs, # 15  tab(s), 0 Refill(s), Type: Acute, Pharmacy: [facility], 1 tab(s) Oral q12 hrs, 65.5, in, 06/18/21 11:29:00 CDT, Height Measured, 166, lb, 06/18/21 11:29:00 CDT, Weight Measured   Problem list:    All Problems  Tobacco abuse / SNOMED CT 927307146 / Confirmed      Histories   Past Medical History:    Active  Tobacco abuse (SNOMED CT 545928015)   Family History:    No family history items have been selected or recorded.   Procedure history:    Epidural steroid injection (SNOMED CT 510472651) performed by Oj Paris MD on 3/6/2020 at 54 Years.  Comments:  12/30/2020 10:11 AM Latesha Philip  Bilateral L5/S1  Epidural steroid injection (SNOMED CT 301439542) performed by Davis Paris MD on 2/14/2020 at 54 Years.  Comments:  12/30/2020 10:08 AM Latesha Philip  Bilateral L4/5   Social History:        Electronic Cigarette/Vaping Assessment            Electronic Cigarette Use: Never.      Alcohol Assessment: Denies Alcohol Use            Never      Tobacco Assessment: Current            10 or more cigarettes (1/2 pack or more)/day in last 30 days, Cigarettes, 20 per day.      Substance Abuse Assessment: Denies Substance Abuse            Never      Employment and Education Assessment            Work/School description: Construction.      Home and Environment Assessment            Marital status: Single.  Spouse/Partner name: Carito Noland.      Nutrition and Health Assessment            Type of diet: Regular.      Exercise and Physical Activity Assessment: Does not exercise            Exercise frequency: Physical job.      Sexual Assessment            Sexually active: Yes.  Sexual orientation: Heterosexual.        Physical Examination   Vital Signs   10/22/2021 4:12 PM CDT Systolic Blood Pressure 126 mmHg    Diastolic Blood Pressure 82 mmHg  HI    Mean Arterial Pressure 97 mmHg    BP Site Right arm    BP Method Manual      Documented vital signs:       Blood Pressure:  Systolic  136  mmHg, Diastolic  84  mmHg.    General:  Alert and oriented, No acute distress.    Respiratory:  Lungs are clear to auscultation, Respirations are non-labored.    Cardiovascular:  Normal rate, Regular rhythm.    Musculoskeletal:       Spine/torso exam: Lumbar ( Tenderness, Pain, No numbness, No tingling, Normal range of motion ).    Integumentary:  Warm, Dry, Pink.       Review / Management   Results review      Impression and Plan   Diagnosis     Low back pain (AAS42-OQ M54.5).     Course:  Progressing as expected.    Plan:       Refer to: Orthopedics, Home PT.    Patient Instructions:       Counseled: Patient, Friend.    Orders     Orders   Requests (Consults / Referrals):  Referral (Request) (Order): 10/22/2021 4:26 PM CDT, Referred to: Orthopaedics, Referred to: Patient would like to go to [facility] [phone], Reason for referral: Back Pain, Low back pain.     Orders   Pharmacy:  oxyCODONE 5 mg oral tablet (Prescribe): = 1 tab(s) ( 5 mg ), Oral, q12 hrs, # 15 tab(s), 0 Refill(s), Type: Acute, Pharmacy: [facility], 1 tab(s) Oral q12 hrs, 65.5, in, 10/22/2021 4:12 PM CDT, Height Measured, 170.3, lb, 10/22/2021 4:12 PM CDT, Weight Measured  oxyCODONE 5 mg oral tablet (Modify): = 1 tab(s) ( 5 mg ), Oral, q12 hrs, # 15 tab(s), 0 Refill(s), Type: Hard Stop, Pharmacy: [facility], 65.5, in, 06/18/21 11:29:00 CDT, Height Measured, 166, lb, 06/18/21 11:29:00 CDT, Weight Measured.     [place] Prescription Drug Monitoring Program checked and reviewed.

## 2022-02-15 NOTE — PROGRESS NOTES
Patient:   DERICK ADAMS            MRN: [id number]            FIN: [id number]               Age:   54 years     Sex:  Male     :  1965   Associated Diagnoses:   None   Author:   Jose Dela Cruz MD       -   Today's date:  10/18/2019 4:23:00 PM .        -   To whom it may concern:        This patient is currently under my care and Was seen in my office on  10/18/2019.  .     Please excuse him/ her from work, for the next  2  weeks, Due to low back pain..  The patient will need follow-up care in  2  weeks.  Please contact me if you have any questions or concerns.      -   Sincerely,             Jose Dela Cruz MD  [address]  [address] [phone]

## 2022-02-15 NOTE — PROGRESS NOTES
Patient:   DERICK ADAMS            MRN: [id number]            FIN: [id number]               Age:   54 years     Sex:  Male     :  1965   Associated Diagnoses:   Low back pain   Author:   Jose Dela Cruz MD      Visit Information      Date of Service: 2020 11:00 am  Performing Location: [facility]  Encounter#: [id number]      Primary Care Provider (PCP):  Jose Dela Cruz MD    NPI# [id number]      Referring Provider:  Jose Dela Cruz MD    NPI# [id number]      Chief Complaint   2020 11:15 AM CST    Work Comp: Back pain; feels worse     Chief complaint and symptoms noted above confirmed with patient.      History of Present Illness             The patient presents with back pain.  The back pain is localized and lumbar region.  The back pain is described as aching.  The severity of the back pain is severe.  The back pain is constant.  Radiation of pain: to the right lower extremity.  Exacerbating factors consist of movement.  Relieving factors consist of Oral Steroids helped.  Associated symptoms consist of denies bladder dysfunction and denies bowel dysfunction.        Review of Systems   Constitutional:  Negative.    Musculoskeletal:       Back pain: In the middle of the back, The pain is severe.       Physical Examination   Vital Signs   2020 11:15 AM CST Temperature Tympanic 97.7 DegF  LOW    Peripheral Pulse Rate 77 bpm    HR Method Electronic    Systolic Blood Pressure 150 mmHg  HI    Diastolic Blood Pressure 86 mmHg  HI    Mean Arterial Pressure 107 mmHg    BP Site Right arm    BP Method Manual    Oxygen Saturation 99 %      General:  Alert and oriented, No acute distress.    Musculoskeletal:       Spine/torso exam: Lumbar ( Tenderness ), Sacral ( Tenderness ).    Integumentary:  Warm, Dry, Pink, No rash.       Impression and Plan   Diagnosis     Low back pain (NFP39-GH M54.5).     Course:  Progressing as expected, Not improving.    Orders     Orders    Requests (Consults / Referrals):  Referral (Request) (Order): 1/9/2020 11:40 AM CST, Referred to: Family Medicine, Referred to: Dr. Wellington Kraus, Reason for referral: Possible KRISSY, Low back pain.     Form filled out for WC/Disability.        Professional Services   Counseling Summary:  This was a 25 minute visit with greater than 50% of that time spent counseling the patient.    Counseling included treatment options, and risks and benefits.    The patient was interactive, attentive, asked questions, and verbalized understanding.    Family present included spouse.

## 2022-02-15 NOTE — PROGRESS NOTES
Patient:   DERICK ADAMS            MRN: [id number]            FIN: [id number]               Age:   55 years     Sex:  Male     :  1965   Associated Diagnoses:   Low back pain   Author:   Jose Dela Cruz MD      Visit Information      Date of Service: 2021 04:20 pm  Performing Location: [facility]  Encounter#: [id number]      Primary Care Provider (PCP):  Jose Dela Cruz MD    NPI# [id number]      Referring Provider:  Jose Dela Cruz MD    NPI# [id number]      Chief Complaint   2021 4:32 PM CST    Pt here for FU on low back pain.     Chief complaint and symptoms noted above confirmed with patient.      History of Present Illness             The patient presents with back pain.  The back pain is localized and located on both sides.  The back pain is described as aching.  The severity of the back pain is moderate.  The back pain is constant.  KRISSY improved a little, not getting worse.  Exacerbating factors consist of movement and prolonged sitting.  Relieving factors consist of analgesics.  Associated symptoms consist of denies bladder dysfunction and denies bowel dysfunction.        Review of Systems   Constitutional:  Negative.    Musculoskeletal:  Negative except as documented in history of present illness.       Health Status   Allergies:    Allergic Reactions (Selected)  Severity Not Documented  Penicillin (No reactions were documented)   Medications:  (Selected)   Prescriptions  Prescribed  baclofen 10 mg oral tablet: = 1 tab(s) ( 10 mg ), Oral, tid, PRN: as needed for muscle spasm, # 90 tab(s), 1 Refill(s), Type: Maintenance, Pharmacy: [facility], 1 tab(s) Oral tid,PRN:as needed for muscle spasm  ibuprofen 800 mg oral tablet: = 1 tab(s), Oral, q8 hrs, Instructions: AS DIRECTED BY PROVIDER., # 30 tab(s), 1 Refill(s), Type: Acute, Pharmacy: [facility], 1 tab(s) Oral q8 hrs,Instr:AS DIRECTED BY PROVIDER., 65.5, in,  10/23/20 13:05:00 CDT, Height Measur...   Problem list:    All Problems  Tobacco abuse / SNOMED CT 222285416 / Confirmed      Histories   Past Medical History:    Active  Tobacco abuse (SNOMED CT 438222737)   Family History:    No family history items have been selected or recorded.   Procedure history:    Epidural steroid injection (SNOMED CT 454200069) performed by Oj Paris MD on 3/6/2020 at 54 Years.  Comments:  12/30/2020 10:11 AM JENNIFER - Latesha Fabian  Bilateral L5/S1  Epidural steroid injection (SNOMED CT 758794828) performed by Davis Paris MD on 2/14/2020 at 54 Years.  Comments:  12/30/2020 10:08 AM Latesha Philip  Bilateral L4/5   Social History:        Electronic Cigarette/Vaping Assessment            Electronic Cigarette Use: Never.      Alcohol Assessment: Denies Alcohol Use            Never      Tobacco Assessment: Current            10 or more cigarettes (1/2 pack or more)/day in last 30 days, Cigarettes, 20 per day.      Substance Abuse Assessment: Denies Substance Abuse            Never      Employment and Education Assessment            Work/School description: Construction.      Home and Environment Assessment            Marital status: Single.  Spouse/Partner name: Carito Noland.      Nutrition and Health Assessment            Type of diet: Regular.      Exercise and Physical Activity Assessment: Does not exercise            Exercise frequency: Physical job.      Sexual Assessment            Sexually active: Yes.  Sexual orientation: Heterosexual.        Physical Examination   Vital Signs   2/18/2021 4:32 PM CST Temperature Tympanic 98 DegF    Peripheral Pulse Rate 100 bpm    Pulse Site Radial artery    Respiratory Rate 16 br/min    Systolic Blood Pressure 152 mmHg  HI    Diastolic Blood Pressure 86 mmHg  HI    Mean Arterial Pressure 108 mmHg    BP Site Right arm      Documented vital signs:       Blood Pressure: Systolic  136  mmHg, Diastolic  84  mmHg.    General:   Alert and oriented, No acute distress.    Respiratory:  Lungs are clear to auscultation, Respirations are non-labored.    Cardiovascular:  Normal rate, Regular rhythm.    Musculoskeletal:       Spine/torso exam: Lumbar ( Tenderness, Pain, No numbness, No tingling, Normal range of motion ).    Integumentary:  Warm, Dry, Pink.       Impression and Plan   Diagnosis     Low back pain (DAR17-CT M54.5).     Course:  Progressing as expected.    Plan:       Refer to: Orthopedics, Home PT.    Patient Instructions:       Counseled: Patient, Friend.    Orders     Orders   Pharmacy:  oxyCODONE 5 mg oral tablet (Prescribe): = 1 tab(s) ( 5 mg ), Oral, q12 hrs, # 15 tab(s), 0 Refill(s), Type: Acute, Pharmacy: [facility], 1 tab(s) Oral q12 hrs, 65.5, in, 2/18/2021 4:32 PM CST, Height Measured, 168, lb, 2/18/2021 4:32 PM CST, Weight Measured.     Orders (Selected)   Outpatient Orders  Ordered  Referral (Request): 02/18/21 16:43:00 CST, Referred to: Orthopaedics, Referred to: [facility], Reason for referral: Second Opinion on Back Surgery, Low back pain.     Orders     Orders   Requests (Return to Office):  Return to Clinic (Request) (Order): Return in 3 months.     Orders (Selected)   Prescriptions  Prescribed  ibuprofen 800 mg oral tablet: = 1 tab(s), Oral, q8 hrs, x 30 day(s), Instructions: AS DIRECTED BY PROVIDER., # 90 tab(s), 5 Refill(s), Type: Acute, Pharmacy: [facility], 1 tab(s) Oral q8 hrs,x30 day(s),Instr:AS DIRECTED BY PROVIDER., 65.5, in, 02/18/21 16:....        Professional Services   Counseling Summary:  This was a 25 minute visit with greater than 50% of that time spent counseling the patient, and coordination of care..    Counseling included treatment options, risks and benefits, lifestyle changes, prognosis, current condition, medications, and dose adjustments.    The patient was interactive, attentive, asked questions, and verbalized understanding.

## 2022-02-15 NOTE — TELEPHONE ENCOUNTER
Entered by Isabell Bronson CMA on May 15, 2020 8:05:37 AM CDT  ---------------------  From: Isabell Bronson CMA   To: [facility]    Sent: 5/15/2020 8:05:37 AM CDT  Subject: Medication Management     ** Not Approved: Medication never prescribed for patient **  ibuprofen (IBUPROFEN 800MG TABS)  TAKE ONE TABLET BY MOUTH EVERY 6 HOURS AS NEEDED FOR PAIN  Qty:  120 unknown unit        Days Supply:  30        Refills:  1          Substitutions Allowed     Route To Pharmacy - [facility]   Signed by Isabell Bronson CMA            ------------------------------------------  From: [facility]  To: Jose Dela Cruz MD  Sent: May 14, 2020 5:05:11 PM CDT  Subject: Medication Management  Due: May 15, 2020 5:05:11 PM CDT    ** On Hold Pending Signature **  Drug: ibuprofen (ibuprofen 800 mg oral tablet)  TAKE ONE TABLET BY MOUTH EVERY 6 HOURS AS NEEDED FOR PAIN  Quantity: 120 unknown unit  Days Supply: 30  Refills: 1  Substitutions Allowed  Notes from Pharmacy:     Dispensed Drug: ibuprofen (ibuprofen 800 mg oral tablet)  TAKE ONE TABLET BY MOUTH EVERY 6 HOURS AS NEEDED FOR PAIN  Quantity: 120 unknown unit  Days Supply: 30  Refills: 1  Substitutions Allowed  Notes from Pharmacy:   ------------------------------------------Patient seen T yesterday and was prescribed Oxycodone 5mg tablets for pain

## 2022-02-15 NOTE — TELEPHONE ENCOUNTER
---------------------  From: Isabell Bronson CMA (Phone Messages Pool ([id number]_[facility]))   To: Jose Dela Cruz MD;     Sent: 5/15/2020 3:54:10 PM CDT  Subject: Phone Message : Medication Management     PCP:   CHT      Time of Call:  340pm       Person Calling:  Carito- girlfriend  Phone number:  [phone]  Leave a detailed Message:     Returned call at:     Note:   Carito called, she states that [facility] was to send in Ibuprofen 800mg and Baclofen 10mg to [facility] in [place]. She also states that the referral for Orthopedics should be placed to [facility] and not [facility], [facility] does not take patients insurance. Please Advise    Last office visit and reason:  5/14/2020    Pharmacy: [facility]    FWD to: [facility]  ** Submitted: **  Order:ibuprofen (ibuprofen 800 mg oral tablet)  1 tab(s)  Oral  q8 hrs  Qty:  90 tab(s)        Refills:  1          Substitutions Allowed     Route To [facility]    Signed by Jose Dela Cruz MD  5/15/2020 9:02:00 PM    ** Documented **  Discontinue:ibuprofen   Signed by Jose Dela Cruz MD  5/15/2020 9:02:00 PM  ** Submitted: **  Order:baclofen (baclofen 10 mg oral tablet)  1 tab(s)  Oral  tid  Qty:  90 tab(s)        Refills:  1          Substitutions Allowed     PRN  as needed for muscle spasm      Route To Pharmacy - [facility]    Signed by Jose Dela Cruz MD  5/15/2020 9:06:00 PM    ** Documented **  Discontinue:baclofen   Signed by Jose Dela Cruz MD  5/15/2020 9:06:00 PM  ** Submitted: **  Order:Referral (Request)  Details:  5/15/2020 4:07 PM CDT, Referred to: Orthopaedics, Referred to: [facility], Reason for referral: Low Back Pain, not responsive to PT, Low back pain syndrome         Signed by Jose Dela Cruz MD  5/15/2020 9:07:00 PM---------------------  From: Jose Dela Cruz MD   To: Phone Messages Pool ([id number]_[place]);     Sent: 5/15/2020 4:07:53 PM  CDT  Subject: RE: Phone Message : Medication Management     [facility].Return Call    Time: 4:17pm    Note: Called to let patient know that Rx was sent in to the pharmacy.

## 2022-02-15 NOTE — LETTER
(Inserted Image. Unable to display)   September 21, 2021    [place] [address]            Dear DERICK,      Thank you for selecting [facility] for your healthcare needs.    Our records indicate you are due for the following services:     Follow-up office visit.    (FYI   Regarding office visits: In some instances, a video visit or telephone visit may be offered as an option.)      To schedule an appointment or if you have further questions, please contact your clinic at [phone].      Powered by [employer]    Sincerely,    Jose Dela Cruz MD

## 2022-02-15 NOTE — PROGRESS NOTES
Patient:   DERICK ADAMS            MRN: [id number]            FIN: [id number]               Age:   55 years     Sex:  Male     :  1965   Associated Diagnoses:   Low back pain   Author:   Jose Dela Cruz MD      Visit Information      Date of Service: 2020 12:58 pm  Performing Location: [facility]  Encounter#: [id number]      Primary Care Provider (PCP):  Jose Dela Cruz MD    NPI# [id number]      Referring Provider:  Jose Dela Cruz MD    NPI# [id number]      Chief Complaint   2020 1:02 PM CDT    Disability paperwork per Principal   Chief complaint and symptoms noted above confirmed with patient.      History of Present Illness             The patient presents with back pain.  The back pain is localized, lumbar region and sacral region.  The back pain is described as aching.  The severity of the back pain is moderate.  The back pain is constant.  The back pain has lasted for 12 month(s).  Radiation of pain: to the left lower extremity and to the right lower extremity.  The context of the back pain: occurred in association with work.  No improvement from last visit.  Seeing [facility] in future..  Exacerbating factors consist of movement, prolonged sitting and standing.  Relieving factors consist of analgesics and rest.  Associated symptoms consist of denies bladder dysfunction and denies bowel dysfunction.        Review of Systems   Constitutional:  Negative.    Musculoskeletal:  Negative except as documented in history of present illness.       Physical Examination   Vital Signs   2020 1:02 PM CDT Temperature Tympanic 97.7 DegF  LOW    Peripheral Pulse Rate 88 bpm    Systolic Blood Pressure 160 mmHg  HI    Diastolic Blood Pressure 96 mmHg  HI    Mean Arterial Pressure 117 mmHg    BP Site Right arm    BP Method Electronic      Documented vital signs:       Blood Pressure: Systolic  136  mmHg, Diastolic  84  mmHg.    General:  Alert and oriented, No acute  distress.    Respiratory:  Lungs are clear to auscultation, Respirations are non-labored.    Cardiovascular:  Normal rate, Regular rhythm.    Musculoskeletal:       Spine/torso exam: Lumbar ( Tenderness, Pain, No numbness, No tingling, Normal range of motion ).    Integumentary:  Warm, Dry, Pink.       Impression and Plan   Diagnosis     Low back pain (YUI23-DA M54.5).     Course:  Progressing as expected.    Plan:       Refer to: Orthopedics, Home PT.    Patient Instructions:       Counseled: Patient, Friend.    Orders     Orders (Selected)   Prescriptions  Prescribed  oxyCODONE 5 mg oral capsule: = 1 cap(s) ( 5 mg ), Oral, q4 hrs, # 15 cap(s), 0 Refill(s), Type: Acute, Pharmacy: [facility], 1 cap(s) Oral q4 hrs, 65.5, in, 05/14/20 11:00:00 CDT, Height Measured, 162, lb, 09/11/20 13:02:00 CDT, Weight Measured.       Already referred to [facility].  Disability form filled out..     [place] Prescription Drug Monitoring Program checked and reviewed.    Has used only 15 since May..        Professional Services   Counseling Summary:  This was a 25 minute visit with greater than 50% of that time spent counseling the patient, and coordination of care..    Counseling included treatment options, risks and benefits, lifestyle changes, prognosis, current condition, medications, and dose adjustments.    The patient was interactive, attentive, asked questions, and verbalized understanding.

## 2022-02-15 NOTE — TELEPHONE ENCOUNTER
---------------------  From: Isabell Bronson CMA (eRx Pool ([id number]_[place]))   To: Jose Dela Cruz MD;     Sent: 8/7/2020 3:22:45 PM CDT  Subject: FW: Medication Management   Due Date/Time: 8/8/2020 2:45:00 PM CDT           ------------------------------------------  From: [facility]  To: Jose Dela Cruz MD  Sent: August 7, 2020 2:45:32 PM CDT  Subject: Medication Management  Due: July 28, 2020 10:29:52 PM CDT     ** On Hold Pending Signature **     Drug: oxyCODONE (oxyCODONE 5 mg oral tablet), TAKE ONE TABLET BY MOUTH EVERY 6 HOURS AS DIRECTED BY PROVIDER  Quantity: 15 unknown unit  Days Supply: 4  Refills: 0  Substitutions Allowed  Notes from Pharmacy:     Dispensed Drug: oxyCODONE (oxyCODONE 5 mg oral tablet), TAKE ONE TABLET BY MOUTH EVERY 6 HOURS AS DIRECTED BY PROVIDER  Quantity: 15 unknown unit  Days Supply: 4  Refills: 0  Substitutions Allowed  Notes from Pharmacy:  ---------------------------------------------------------------  From: Jose Dela Cruz MD   To: [facility]    Sent: 8/7/2020 3:44:17 PM CDT  Subject: FW: Medication Management     ** Not Approved: Patient needs appointment **  oxyCODONE (OXYCODONE HCL 5MG TABS)  TAKE ONE TABLET BY MOUTH EVERY 6 HOURS AS DIRECTED BY PROVIDER  Qty:  15 unknown unit        Days Supply:  4        Refills:  0          Substitutions Allowed     Route To Pharmacy - [facility]

## 2022-02-15 NOTE — NURSING NOTE
Comprehensive Intake Entered On:  10/9/2019 2:01 PM CDT    Performed On:  10/9/2019 1:58 PM CDT by Jessica Saunders CMA               Summary   Chief Complaint :   W/C- 10/1/19 Pinched nerve in back; seen in [facility]   Menstrual Status :   N/A   Weight Measured :   147.6 lb(Converted to: 147 lb 10 oz, 66.95 kg)    Systolic Blood Pressure :   142 mmHg (HI)    Diastolic Blood Pressure :   90 mmHg (HI)    Mean Arterial Pressure :   107 mmHg   Peripheral Pulse Rate :   102 bpm (HI)    BP Site :   Left arm   BP Method :   Manual   HR Method :   Electronic   Oxygen Saturation :   98 %   Jessica Saunders CMA - 10/9/2019 1:58 PM CDT   Health Status   Allergies Verified? :   Yes   Medication History Verified? :   Yes   Medical History Verified? :   Yes   Tobacco Use? :   Current every day smoker   Jessica Saunders CMA - 10/9/2019 1:58 PM CDT   Consents   Consent for Immunization Exchange :   Consent Granted   Consent for Immunizations to Providers :   Consent Granted   Jessica Saunders CMA - 10/9/2019 1:58 PM CDT   Meds / Allergies   (As Of: 10/9/2019 2:01:35 PM CDT)   Allergies (Active)   penicillin  Estimated Onset Date:   Unspecified ; Created By:   Michelle Bonilla; Reaction Status:   Active ; Category:   Drug ; Substance:   penicillin ; Type:   Allergy ; Updated By:   Michelle Bonilla; Reviewed Date:   10/9/2019 2:00 PM CDT        Medication List   (As Of: 10/9/2019 2:01:35 PM CDT)   Prescription/Discharge Order    cyclobenzaprine  :   cyclobenzaprine ; Status:   Processing ; Ordered As Mnemonic:   cyclobenzaprine 10 mg oral tablet ; Ordering Provider:   Jose Dela Cruz MD; Action Display:   Complete ; Catalog Code:   cyclobenzaprine ; Order Dt/Tm:   10/9/2019 2:00:22 PM CDT          meloxicam  :   meloxicam ; Status:   Processing ; Ordered As Mnemonic:   meloxicam 15 mg oral tablet ; Ordering Provider:   Jose Dela Cruz MD; Action Display:   Complete ; Catalog Code:   meloxicam ; Order Dt/Tm:   10/9/2019 2:00:22 PM CDT

## 2022-02-15 NOTE — PROGRESS NOTES
Patient:   DERICK ADAMS            MRN: [id number]            FIN: [id number]               Age:   54 years     Sex:  Male     :  1965   Associated Diagnoses:   Low back pain syndrome   Author:   Jose Dela Cruz MD      Visit Information      Date of Service: 2020 08:13 am  Performing Location: [facility]  Encounter#: [id number]      Primary Care Provider (PCP):  Jose Dela Cruz MD    NPI# [id number]      Referring Provider:  Jose Dela Cruz MD    NPI# [id number]   Visit type:  Telephone Encounter.    Source of history:  Patient.    Location of patient: Home  Call Start Time:   11:13  Call End Time:    11:25      Chief Complaint   2020 11:00 AM CDT   Pain Management med check; Verbal permission for telephone visit     _      History of Present Illness   Today's visit was conducted via telephone due to the COVID-19 pandemic. Patient's consent to telephone visit was obtained and documented.      Reason for visit:  _             The patient presents with back pain.  The back pain is localized and lumbar region.  The back pain is described as aching.  The severity of the back pain is moderate.  The back pain is constant.  Exacerbating factors consist of none.  Relieving factors consist of analgesics.  Associated symptoms consist of denies bladder dysfunction and denies bowel dysfunction.        Review of Systems   Constitutional:  Negative.    Musculoskeletal:       Back pain: In the lower region, The pain is moderate, Doing home PT, Not radiating.    Neurologic:  Negative except as documented in history of present illness.       Impression and Plan   Diagnosis     Low back pain syndrome (YPI78-HE M54.5).     Course:  Progressing as expected, Unchanged.    Patient Instructions:       Counseled: Patient, Verbalized understanding.    Orders     Orders   Requests (Consults / Referrals):  Referral (Request) (Order): 2020 11:20 AM CDT, Referred to: Orthopaedics,  Referred to: [facility], Reason for referral: Low Back Pain, not responsive to PT, Low back pain syndrome.     Orders   Pharmacy:  oxyCODONE 5 mg oral capsule (Prescribe): = 1 cap(s) ( 5 mg ), Oral, q6 hrs, # 15 cap(s), 0 Refill(s), Type: Acute, Pharmacy: [facility], 1 cap(s) Oral q6 hrs  oxyCODONE 5 mg oral capsule (Discontinue).        Health Status   Allergies:    Allergic Reactions (Selected)  Severity Not Documented  Penicillin (No reactions were documented)   Medications:  (Selected)   Documented Medications  Documented  baclofen: ( 10 mg ), Oral, tid, 0 Refill(s), Type: Maintenance  ibuprofen: ( 800 mg ), q6 hrs, 0 Refill(s), Type: Maintenance  oxyCODONE 5 mg oral capsule: = 1 cap(s) ( 5 mg ), Oral, q6 hrs, 0 Refill(s), Type: Maintenance   Problem list:    All Problems  Tobacco abuse / SNOMED CT 137306111 / Confirmed      Histories   Past Medical History:    Active  Tobacco abuse (SNOMED CT 394120798)   Family History:    No family history items have been selected or recorded.   Procedure history:    No active procedure history items have been selected or recorded.   Social History:        Alcohol Assessment: Denies Alcohol Use            Never      Tobacco Assessment: Current            Cigarettes, 20 per day.      Substance Abuse Assessment: Denies Substance Abuse            Never      Employment and Education Assessment            Work/School description: Construction.      Home and Environment Assessment            Marital status: Single.  Spouse/Partner name: Carito Noland.      Nutrition and Health Assessment            Type of diet: Regular.      Exercise and Physical Activity Assessment: Does not exercise            Exercise frequency: Physical job.      Sexual Assessment            Sexually active: Yes.  Sexual orientation: Heterosexual.        Physical Examination   Measurements from flowsheet : Measurements   5/14/2020 11:00 AM CDT   Height Measured - Standard                 65.5 in

## 2022-02-15 NOTE — PROGRESS NOTES
Patient:   DERICK ADAMS            MRN: [id number]            FIN: [id number]               Age:   54 years     Sex:  Male     :  1965   Associated Diagnoses:   Low back pain   Author:   Jose Dela Cruz MD      Visit Information      Date of Service: 2019 02:19 pm  Performing Location: [facility]  Encounter#: [id number]      Primary Care Provider (PCP):  Jose Dela Cruz MD    NPI# [id number]      Referring Provider:  Jose Dela Cruz MD    NPI# [id number]      Chief Complaint   2019 2:22 PM CST   Work Comp follow up Back pain     Chief complaint and symptoms noted above confirmed with patient.      History of Present Illness             The patient presents with back pain.  The back pain is generalized.  The back pain is described as aching.  The severity of the back pain is moderate.  The back pain is constant.  Exacerbating factors consist of none.  Relieving factors consist of medication and ibuprofen.  Associated symptoms consist of denies bladder dysfunction, denies bowel dysfunction, denies decreased sensation and denies dysuria.        Review of Systems   Constitutional:  Negative.    Musculoskeletal:       Back pain: In the middle of the back, The pain is severe.       Physical Examination   Vital Signs   2019 2:22 PM CST Temperature Tympanic 98.8 DegF    Peripheral Pulse Rate 100 bpm    Pulse Site Radial artery    HR Method Manual    Systolic Blood Pressure 148 mmHg  HI    Diastolic Blood Pressure 90 mmHg  HI    Mean Arterial Pressure 109 mmHg    BP Site Right arm    BP Method Manual      General:  Alert and oriented, No acute distress.    Musculoskeletal:       Spine/torso exam: Lumbar ( Tenderness ), Sacral ( Tenderness ).    Integumentary:  Warm, Dry, Pink, No rash.       Impression and Plan   Diagnosis     Low back pain (KLV37-ZA M54.5).     Course:  Progressing as expected, Not improving.    Orders     Orders   Pharmacy:  acetaminophen-hydrocodone  325 mg-5 mg oral tablet (Prescribe): 1 tab(s), Oral, q4 hrs, Instructions: Partial Fill on Request, PRN: for pain, # 20 tab(s), 0 Refill(s), Type: Acute, Pharmacy: [facility], 1 tab(s) Oral q4 hrs,PRN:for pain,Instr:Partial Fill on Request.     Orders   Pharmacy:  predniSONE 20 mg oral tablet (Prescribe): = 2 tab(s) ( 40 mg ), PO, Daily, x 5 day(s), # 10 tab(s), 0 Refill(s), Type: Maintenance, Pharmacy: [facility], 2 tab(s) Oral daily,x5 day(s).

## 2022-02-15 NOTE — PROGRESS NOTES
Patient:   DERICK ADAMS            MRN: [id number]            FIN: [id number]               Age:   52 years     Sex:  Male     :  1965   Associated Diagnoses:   None   Author:   Narendra Cisneros PA-C       -   Today's date:  2017 8:25:00 AM .        -   To whom it may concern:        This patient is currently under my care.  He/ she may return to He requires assistance with cares. He cares for significant other with many health conditions..      -   Best regards,

## 2022-02-15 NOTE — PROGRESS NOTES
Patient:   DERICK ADAMS            MRN: [id number]            FIN: [id number]               Age:   54 years     Sex:  Male     :  1965   Associated Diagnoses:   None   Author:   Jose Dela Cruz MD       -   Today's date:  2019 11:19:00 AM .        -   To whom it may concern:        This patient is currently under my care and Was seen in my office.  .     Please excuse him/ her from for the next  2  weeks.  The patient will need follow-up care in  2  weeks.  Please contact me if you have any questions or concerns.      -   Sincerely,             Jose Dela Cruz MD  [address]  [address] [phone]

## 2022-02-15 NOTE — LETTER
(Inserted Image. Unable to display)   [address] [place] [address]  [phone]    February 19, 2021      DERICK NUNEZZIER      [address]  [place], [place] [id number]        Dear DERICK,    Thank you for selecting [facility] for your healthcare needs. Below you will find the result of your recent test(s) done at our clinic.     These are all normal.      Result Name Current Result Previous Result Reference Range   Sodium Level (mmol/L)  139 2/18/2021  141 3/5/2020 135 - 146   Potassium Level (mmol/L)  4.9 2/18/2021  4.7 3/5/2020 3.5 - 5.3   Chloride Level (mmol/L)  104 2/18/2021  103 3/5/2020 98 - 110   CO2 Level (mmol/L)  29 2/18/2021  31 3/5/2020 20 - 32   Glucose Level (mg/dL)  89 2/18/2021 ((H)) 121 3/5/2020 65 - 99   BUN (mg/dL)  23 2/18/2021  16 3/5/2020 7 - 25   Creatinine Level (mg/dL)  1.11 2/18/2021  0.77 3/5/2020 0.70 - 1.33   Calcium Level (mg/dL)  9.3 2/18/2021  9.8 3/5/2020 8.6 - 10.3       Please contact my practice at [phone] if you have any questions or concerns.      Sincerely,        Jose Dela Cruz MD ,   Latesha Nash MD,   Narendra Cisneros PA-C

## 2022-02-15 NOTE — NURSING NOTE
Comprehensive Intake Entered On:  11/5/2019 10:56 AM CST    Performed On:  11/5/2019 10:53 AM CST by Isabell Bronson CMA               Summary   Chief Complaint :   WORK COMP : Back pain; no improvement   Menstrual Status :   N/A   Weight Measured :   150.4 lb(Converted to: 150 lb 6 oz, 68.22 kg)    Height Measured :   65.5 in(Converted to: 5 ft 5 in, 166.37 cm)    Body Mass Index :   24.64 kg/m2   Body Surface Area :   1.77 m2   Systolic Blood Pressure :   128 mmHg   Diastolic Blood Pressure :   80 mmHg   Mean Arterial Pressure :   96 mmHg   Peripheral Pulse Rate :   100 bpm   Oxygen Saturation :   98 %   Isabell Bronson CMA - 11/5/2019 10:53 AM CST   Health Status   Allergies Verified? :   Yes   Medication History Verified? :   Yes   Medical History Verified? :   Yes   Pre-Visit Planning Status :   Completed   Tobacco Use? :   Current every day smoker   Tobacco Cessation Review :   Not ready to quit   Isabell Bronson CMA - 11/5/2019 10:53 AM CST   Consents   Consent for Immunization Exchange :   Consent Granted   Consent for Immunizations to Providers :   Consent Granted   Isabell Bronson CMA - 11/5/2019 10:53 AM CST   Meds / Allergies   (As Of: 11/5/2019 10:56:49 AM CST)   Allergies (Active)   penicillin  Estimated Onset Date:   Unspecified ; Created By:   Michelle Bonilla; Reaction Status:   Active ; Category:   Drug ; Substance:   penicillin ; Type:   Allergy ; Updated By:   Michelle Bonilla; Reviewed Date:   11/5/2019 10:55 AM CST        Medication List   (As Of: 11/5/2019 10:56:49 AM CST)   Prescription/Discharge Order    cyclobenzaprine  :   cyclobenzaprine ; Status:   Prescribed ; Ordered As Mnemonic:   cyclobenzaprine 10 mg oral tablet ; Simple Display Line:   10 mg, 1 tab(s), PO, TID, PRN: for spasm, 30 tab(s), 0 Refill(s) ; Ordering Provider:   Jose Dela Cruz MD; Catalog Code:   cyclobenzaprine ; Order Dt/Tm:   10/18/2019 4:20:48 PM CDT          indomethacin  :   indomethacin ; Status:   Prescribed ; Ordered As  Mnemonic:   indomethacin 50 mg oral capsule ; Simple Display Line:   50 mg, 1 cap(s), Oral, tid, PRN: for pain, 40 EA, 0 Refill(s) ; Ordering Provider:   Jose Dela Cruz MD; Catalog Code:   indomethacin ; Order Dt/Tm:   10/18/2019 4:20:49 PM CDT          methylPREDNISolone  :   methylPREDNISolone ; Status:   Processing ; Ordered As Mnemonic:   Medrol Dosepak 4 mg oral tablet ; Ordering Provider:   Jean-Pierre Sharma PA-C; Action Display:   Complete ; Catalog Code:   methylPREDNISolone ; Order Dt/Tm:   11/5/2019 10:55:36 AM CST

## 2022-02-15 NOTE — TELEPHONE ENCOUNTER
---------------------  From: Trini Sesay CMA (Phone Messages Pool ([id number]_[facility]))   Sent: 11/23/2020 3:55:52 PM CST  Subject: Ibuprofen      Phone Message    PCP:   CARMELO MEDRANO      Time of Call:  1527       Person Calling:  Wife  Phone number:  [phone]    Returned call at: 1551    Note:   Refill of ibuprofen. Return call informed ibuprofen was refilled same directions as last refill.    Last office visit and reason:  10/23/20 otitis/otorrhea BAM

## 2022-02-15 NOTE — TELEPHONE ENCOUNTER
"---------------------  From: Ramya Aguilar CMA (Phone Messages [place] [id number]_[place]))   To: Jose Dela Cruz MD;     Sent: 11/12/2019 3:50:29 PM CST  Subject: Phone Note: F/U PT      Phone Message    PCP:   JOVANIT      Time of Call:  3:09 pm    Phone number:  [phone]    Returned call at: n/a    Note:   Juan Chandrakant from [facility] called you with a follow up on how the patient is doing. After several weeks of therapy he is still not having much relief. He is on his third round of prednisone and still in \"rough shape\". He is a work comp case for low back and right leg buckling. Juan did advise him to f/u with [facility] as physical therapy is not helping. He also thinks he is a candidate for an MRI. Patient is going to reach out to [facility] and if he is not able to get in for an apt for 2-3 weeks he advised the patient to call you to discuss getting an MRI set up. Advised for you to call Juan if you have any further questions.     Pharmacy: n/a    Last office visit and reason: 11/5/19; work comp    Transferred to: [facility]  "

## 2022-02-15 NOTE — TELEPHONE ENCOUNTER
---------------------  From: Imelda Leach   To: [facility] Message Pool ([id number]_[facility]);     Sent: 10/12/2021 3:24:54 PM CDT  Subject: Scheduling Management     Pt no showed for appt with CHT 10.12.21 at 240pm / Reason: F-U back pain issues

## 2022-02-15 NOTE — NURSING NOTE
Comprehensive Intake Entered On:  10/18/2019 4:03 PM CDT    Performed On:  10/18/2019 3:59 PM CDT by Garima Laech MA               Summary   Chief Complaint :   Work Comp follow up Back pain, feels better   Menstrual Status :   N/A   Weight Measured :   148.8 lb(Converted to: 148 lb 13 oz, 67.49 kg)    Height Measured :   65.5 in(Converted to: 5 ft 5 in, 166.37 cm)    Body Mass Index :   24.38 kg/m2   Body Surface Area :   1.76 m2   Systolic Blood Pressure :   160 mmHg (HI)    Diastolic Blood Pressure :   80 mmHg   Mean Arterial Pressure :   107 mmHg   Peripheral Pulse Rate :   88 bpm   BP Site :   Right arm   Pulse Site :   Radial artery   BP Method :   Manual   HR Method :   Manual   Temperature Tympanic :   98.9 DegF(Converted to: 37.2 DegC)    Garima Leach MA - 10/18/2019 3:59 PM CDT   Health Status   Allergies Verified? :   Yes   Medication History Verified? :   Yes   Medical History Verified? :   Yes   Pre-Visit Planning Status :   Completed   Tobacco Use? :   Current every day smoker   Garima Leach MA - 10/18/2019 3:59 PM CDT   Consents   Consent for Immunization Exchange :   Consent Granted   Consent for Immunizations to Providers :   Consent Granted   Garima Leach MA - 10/18/2019 3:59 PM CDT   Meds / Allergies   (As Of: 10/18/2019 4:03:46 PM CDT)   Allergies (Active)   penicillin  Estimated Onset Date:   Unspecified ; Created By:   Michelle Bonilla; Reaction Status:   Active ; Category:   Drug ; Substance:   penicillin ; Type:   Allergy ; Updated By:   Michelle Bonilla; Reviewed Date:   10/18/2019 4:01 PM CDT        Medication List   (As Of: 10/18/2019 4:03:46 PM CDT)   Prescription/Discharge Order    cyclobenzaprine  :   cyclobenzaprine ; Status:   Prescribed ; Ordered As Mnemonic:   cyclobenzaprine 10 mg oral tablet ; Simple Display Line:   10 mg, 1 tab(s), PO, TID, PRN: for spasm, 30 tab(s), 0 Refill(s) ; Ordering Provider:   Jean-Pierre Sharma PA-C; Catalog Code:   cyclobenzaprine ; Order Dt/Tm:    10/9/2019 2:18:12 PM CDT          indomethacin  :   indomethacin ; Status:   Prescribed ; Ordered As Mnemonic:   indomethacin 50 mg oral capsule ; Simple Display Line:   50 mg, 1 cap(s), Oral, tid, PRN: for pain, 40 EA, 0 Refill(s) ; Ordering Provider:   Jean-Pierre Sharma PA-C; Catalog Code:   indomethacin ; Order Dt/Tm:   10/9/2019 2:18:48 PM CDT          methylPREDNISolone  :   methylPREDNISolone ; Status:   Prescribed ; Ordered As Mnemonic:   Medrol Dosepak 4 mg oral tablet ; Simple Display Line:   1 packet(s), Oral, once, as directed on package labeling, 21 tab(s), 0 Refill(s) ; Ordering Provider:   Jean-Pierre Sharma PA-C; Catalog Code:   methylPREDNISolone ; Order Dt/Tm:   10/9/2019 2:17:36 PM CDT

## 2022-02-15 NOTE — TELEPHONE ENCOUNTER
---------------------  From: Amna Monsivais MA (Phone Messages Pool ([id number]_[facility]))   To: [facility] Message Pool ([id number]_[facility]);     Sent: 2/1/2021 11:52:50 AM CST  Subject: Phone conference     Phone Message    PCP:   CHT      Time of Call:  1106       Person Calling:  Kavita from [employer]  Phone number:  [phone]    Reason for call:  would like to set up phone conference w/ [facility] and pt's  re: WC claim  Returned call at: _    Note:   _    Last office visit and reason:  9/11/2020 w/ C[facility] for back pain, fill out disability ppwk    [facility].---------------------  From: Phyllis Posada CMA ([facility] Message Pool ([id number]_[facility]))   To: Perri Mixon;     Sent: 2/2/2021 8:56:31 AM CST  Subject: FW: Phone conference

## 2022-02-15 NOTE — PROGRESS NOTES
Patient:   DERICK ADAMS            MRN: [id number]            FIN: [id number]               Age:   54 years     Sex:  Male     :  1965   Associated Diagnoses:   Low back pain   Author:   Jean-Pierre Sharma PA-C      Chief Complaint   10/9/2019 1:58 PM CDT    W/C- 10/1/19 Pinched nerve in back; seen in [place] [facility]      History of Present Illness   Chief complaint and symptoms noted above and confirmed with patient   he works at [employer]  he works unloading trucks  on 10/1 he went to work, but had some pain in low back  he went to ER on 10/4,  patient reports xrays were normal  was given off work until 10/8    pain in low back, pain on right side radiates down to shin, on left side pain goes down to thigh  no problems with urination or BMs  taking ibuprofen (600 mg-800 mg) q 3 hrs             Review of Systems   Constitutional:  Negative.    Ear/Nose/Mouth/Throat:  Negative.    Respiratory:  Negative.    Musculoskeletal:  Back pain.       Health Status   Allergies:    Allergic Reactions (Selected)  Severity Not Documented  Penicillin (No reactions were documented)      Physical Examination   Vital Signs   10/9/2019 1:58 PM CDT Peripheral Pulse Rate 102 bpm  HI    HR Method Electronic    Systolic Blood Pressure 142 mmHg  HI    Diastolic Blood Pressure 90 mmHg  HI    Mean Arterial Pressure 107 mmHg    BP Site Left arm    BP Method Manual    Oxygen Saturation 98 %      Measurements from flowsheet : Measurements   10/9/2019 1:58 PM CDT    Weight Measured - Standard                147.6 lb     General:  No acute distress.    Respiratory:  Lungs are clear to auscultation.    Cardiovascular:  Normal rate, Regular rhythm, No murmur.    Musculoskeletal:  good strength with heel and toe raise.  Patella and achilles DTRs symmetrical.  Good strength with resisted dorsal and plantar flexion. Good strength with resisted flexion and extension of lower legs. Straight leg raise elicits pain at 60 degrees on  right leg, at 75 degrees on left leg  pain with palpation over right SIJ.    Psychiatric:  Appropriate mood & affect.       Impression and Plan   Diagnosis     Low back pain ([id number] M54.5).     Summary:  will treat with indomethacin (at patient's request), flexeril and medrol dosepak, refer to PT,  work restrictions for 2 weeks, follow up if not improving.    Orders     Orders   Pharmacy:  indomethacin 50 mg oral capsule (Prescribe): = 1 cap(s) ( 50 mg ), Oral, tid, PRN: for pain, # 40 EA, 0 Refill(s), Type: Maintenance, Pharmacy: [facility], 1 cap(s) Oral tid,PRN:for pain  cyclobenzaprine 10 mg oral tablet (Prescribe): = 1 tab(s) ( 10 mg ), PO, TID, PRN: for spasm, # 30 tab(s), 0 Refill(s), Type: Maintenance, Pharmacy: [facility], 1 tab(s) Oral tid,PRN:for spasm  Medrol Dosepak 4 mg oral tablet (Prescribe): = 1 packet(s), Oral, once, Instructions: as directed on package labeling, # 21 tab(s), 0 Refill(s), Type: Soft Stop, Pharmacy: [facility], 1 packet(s) Oral once,Instr:as directed on package labeling.     Orders   Requests (Procedures):  Physical Therapy (Request) (Order): Low back pain.     Orders   Charges (Evaluation and Management):  [id number] office outpatient visit 15 minutes (Charge) (Order): Quantity: 1, Low back pain.

## 2022-02-15 NOTE — PROGRESS NOTES
Patient:   DERICK ADAMS            MRN: [id number]            FIN: [id number]               Age:   54 years     Sex:  Male     :  1965   Associated Diagnoses:   Low back pain   Author:   Jose Dela Cruz MD      Visit Information      Date of Service: 10/18/2019 03:56 pm  Performing Location: [facility]  Encounter#: [id number]      Primary Care Provider (PCP):  Jose Dela Cruz MD    NPI# [id number]      Referring Provider:  Jose Dela Cruz MD    NPI# [id number]      Chief Complaint   10/18/2019 3:59 PM CDT   Work Comp follow up Back pain, feels better     Chief complaint and symptoms noted above confirmed with patient.      History of Present Illness             The patient presents with back pain.  The back pain is lumbar region.  The back pain is described as aching and shooting.  The severity of the back pain is moderate.  The back pain is constant.  The back pain has lasted for Since 2019.  Exacerbating factors consist of movement.  Relieving factors consist of medication and rest.  Associated symptoms consist of denies bladder dysfunction and denies bowel dysfunction.        Review of Systems   Constitutional:  Negative.    Musculoskeletal:  Negative except as documented in history of present illness.       Physical Examination   Vital Signs   10/18/2019 3:59 PM CDT Temperature Tympanic 98.9 DegF    Peripheral Pulse Rate 88 bpm    Pulse Site Radial artery    HR Method Manual    Systolic Blood Pressure 160 mmHg  HI    Diastolic Blood Pressure 80 mmHg    Mean Arterial Pressure 107 mmHg    BP Site Right arm    BP Method Manual      General:  Alert and oriented, No acute distress.    Musculoskeletal:       Spine/torso exam: Lumbar ( Tenderness, Straight leg raising, sitting/distracted ( Positive ) ).    Integumentary:  Warm, Dry, Pink.    Neurologic:  Normal deep tendon reflexes.       Impression and Plan   Diagnosis     Low back pain ([id number] M54.5).      Course:  Progressing as expected.    Orders     Orders (Selected)   Prescriptions  Prescribe  cyclobenzaprine 10 mg oral tablet: = 1 tab(s) ( 10 mg ), PO, TID, PRN: for spasm, # 30 tab(s), 0 Refill(s), Type: Maintenance, Pharmacy: [facility], 1 tab(s) Oral tid,PRN:for spasm  indomethacin 50 mg oral capsule: = 1 cap(s) ( 50 mg ), Oral, tid, PRN: for pain, # 40 EA, 0 Refill(s), Type: Maintenance, Pharmacy: [facility], 1 cap(s) Oral tid,PRN:for pain.     Will give a note for work.  Advised need to complete PT..

## 2022-02-15 NOTE — TELEPHONE ENCOUNTER
---------------------  From: Lanie SKINNER De Berry   To: DERICK ADAMS    Sent: 3/6/2020 8:03:21 AM CST  Subject: Patient Message - Results Notification        These results show your kidney function is normal.    Jimmy Dela Cruz MD    Results:  Date Result Name Ind Value Ref Range   3/5/2020 2:33 PM Sodium Level  141 mmol/L (135 - 146)   3/5/2020 2:33 PM Potassium Level  4.7 mmol/L (3.5 - 5.3)   3/5/2020 2:33 PM Chloride Level  103 mmol/L (98 - 110)   3/5/2020 2:33 PM CO2 Level  31 mmol/L (20 - 32)   3/5/2020 2:33 PM Glucose Level ((H)) 121 mg/dL (65 - 99)   3/5/2020 2:33 PM BUN  16 mg/dL (7 - 25)   3/5/2020 2:33 PM Creatinine Level  0.77 mg/dL (0.70 - 1.33)   3/5/2020 2:33 PM BUN/Creat Ratio  NOT APPLICABLE (6 - 22)   3/5/2020 2:33 PM eGFR  104 mL/min/1.73m2 (> OR = 60 - )   3/5/2020 2:33 PM eGFR African American  121 mL/min/1.73m2 (> OR = 60 - )   3/5/2020 2:33 PM Calcium Level  9.8 mg/dL (8.6 - 10.3)

## 2022-02-15 NOTE — NURSING NOTE
Comprehensive Intake Entered On:  1/9/2020 11:19 AM CST    Performed On:  1/9/2020 11:15 AM CST by Jessica Saunders CMA               Summary   Chief Complaint :   Work Comp: Back pain; feels worse   Menstrual Status :   N/A   Weight Measured :   161.0 lb(Converted to: 161 lb 0 oz, 73.03 kg)    Height Measured :   65.5 in(Converted to: 5 ft 5 in, 166.37 cm)    Body Mass Index :   26.38 kg/m2 (HI)    Body Surface Area :   1.84 m2   Systolic Blood Pressure :   150 mmHg (HI)    Diastolic Blood Pressure :   86 mmHg (HI)    Mean Arterial Pressure :   107 mmHg   Peripheral Pulse Rate :   77 bpm   BP Site :   Right arm   BP Method :   Manual   HR Method :   Electronic   Temperature Tympanic :   97.7 DegF(Converted to: 36.5 DegC)  (LOW)    Oxygen Saturation :   99 %   Jessica Saunders CMA - 1/9/2020 11:15 AM CST   Health Status   Allergies Verified? :   Yes   Medication History Verified? :   Yes   Medical History Verified? :   Yes   Pre-Visit Planning Status :   Completed   Tobacco Use? :   Current every day smoker   Jessica Saunders CMA - 1/9/2020 11:15 AM CST   Consents   Consent for Immunization Exchange :   Consent Granted   Consent for Immunizations to Providers :   Consent Granted   Jessica Saunders CMA - 1/9/2020 11:15 AM CST   Meds / Allergies   (As Of: 1/9/2020 11:19:53 AM CST)   Allergies (Active)   penicillin  Estimated Onset Date:   Unspecified ; Created By:   Michelle Bonilla; Reaction Status:   Active ; Category:   Drug ; Substance:   penicillin ; Type:   Allergy ; Updated By:   Michelle Bonilla; Reviewed Date:   1/9/2020 11:16 AM CST        Medication List   (As Of: 1/9/2020 11:19:53 AM CST)   Prescription/Discharge Order    acetaminophen-codeine  :   acetaminophen-codeine ; Status:   Completed ; Ordered As Mnemonic:   Tylenol with Codeine #3 oral tablet ; Simple Display Line:   1 tab(s), PO, q4hr, PRN: for pain, 18 tab(s), 0 Refill(s) ; Ordering Provider:   Narendra Cisneros PA-C; Catalog Code:   acetaminophen-codeine ; Order  Dt/Tm:   11/20/2017 8:26:57 AM CST          acetaminophen-hydrocodone  :   acetaminophen-hydrocodone ; Status:   Completed ; Ordered As Mnemonic:   acetaminophen-hydrocodone 325 mg-5 mg oral tablet ; Simple Display Line:   1 tab(s), Oral, q4 hrs, Partial Fill on Request, PRN: for pain, 20 tab(s), 0 Refill(s) ; Ordering Provider:   Jose Dela Cruz MD; Catalog Code:   acetaminophen-hydrocodone ; Order Dt/Tm:   12/31/2019 2:55:49 PM CST          cyclobenzaprine  :   cyclobenzaprine ; Status:   Discontinued ; Ordered As Mnemonic:   cyclobenzaprine 10 mg oral tablet ; Simple Display Line:   10 mg, 1 tab(s), PO, TID, PRN: for spasm, 30 tab(s), 0 Refill(s) ; Ordering Provider:   Jose Dela Cruz MD; Catalog Code:   cyclobenzaprine ; Order Dt/Tm:   10/18/2019 4:20:48 PM CDT          cyclobenzaprine  :   cyclobenzaprine ; Status:   Completed ; Ordered As Mnemonic:   cyclobenzaprine 10 mg oral tablet ; Simple Display Line:   10 mg, 1 tab(s), PO, TID, PRN: for spasm, 30 tab(s), 0 Refill(s) ; Ordering Provider:   Jose Dela Cruz MD; Catalog Code:   cyclobenzaprine ; Order Dt/Tm:   10/9/2019 2:18:12 PM CDT          cyclobenzaprine  :   cyclobenzaprine ; Status:   Completed ; Ordered As Mnemonic:   cyclobenzaprine 10 mg oral tablet ; Simple Display Line:   10 mg, 1 tab(s), PO, qhs, PRN: for spasm, 30 tab(s), 0 Refill(s) ; Ordering Provider:   Jose Dela Cruz MD; Catalog Code:   cyclobenzaprine ; Order Dt/Tm:   3/8/2018 4:01:13 PM CST          indomethacin  :   indomethacin ; Status:   Discontinued ; Ordered As Mnemonic:   indomethacin 50 mg oral capsule ; Simple Display Line:   50 mg, 1 cap(s), Oral, tid, PRN: for pain, 40 EA, 0 Refill(s) ; Ordering Provider:   Jose Dela Cruz MD; Catalog Code:   indomethacin ; Order Dt/Tm:   10/18/2019 4:20:49 PM CDT          indomethacin  :   indomethacin ; Status:   Completed ; Ordered As Mnemonic:   indomethacin 50 mg oral capsule ; Simple Display Line:   50 mg, 1  cap(s), Oral, tid, PRN: for pain, 40 EA, 0 Refill(s) ; Ordering Provider:   Jose Dela Cruz MD; Catalog Code:   indomethacin ; Order Dt/Tm:   10/9/2019 2:18:48 PM CDT          meloxicam  :   meloxicam ; Status:   Completed ; Ordered As Mnemonic:   meloxicam 15 mg oral tablet ; Simple Display Line:   15 mg, 1 tab(s), PO, Daily, 30 tab(s), 0 Refill(s) ; Ordering Provider:   Jose Dela Cruz MD; Catalog Code:   meloxicam ; Order Dt/Tm:   3/8/2018 4:00:53 PM CST          meloxicam  :   meloxicam ; Status:   Discontinued ; Ordered As Mnemonic:   meloxicam 15 mg oral tablet ; Simple Display Line:   15 mg, 1 tab(s), PO, Daily, PRN: for pain, 30 tab(s), 0 Refill(s) ; Ordering Provider:   Jose Dela Cruz MD; Catalog Code:   meloxicam ; Order Dt/Tm:   3/21/2016 3:49:30 PM CDT          methylPREDNISolone  :   methylPREDNISolone ; Status:   Completed ; Ordered As Mnemonic:   Medrol Dosepak 4 mg oral tablet ; Simple Display Line:   1 packet(s), Oral, once, as directed on package labeling, 21 tab(s), 0 Refill(s) ; Ordering Provider:   Jean-Pierre Sharma PA-C; Catalog Code:   methylPREDNISolone ; Order Dt/Tm:   10/9/2019 2:17:36 PM CDT          methylPREDNISolone  :   methylPREDNISolone ; Status:   Completed ; Ordered As Mnemonic:   Medrol Dosepak 4 mg oral tablet ; Simple Display Line:   1 packet(s), PO, Once, as directed on package labeling, 21 tab(s) ; Ordering Provider:   Jose Dela Cruz MD; Catalog Code:   methylPREDNISolone ; Order Dt/Tm:   8/28/2014 3:32:31 PM CDT          predniSONE  :   predniSONE ; Status:   Completed ; Ordered As Mnemonic:   predniSONE 10 mg oral tablet ; Simple Display Line:   See Instructions, 4 tabs daily for 4 days then 3 tabs daily for 4 days then 2 tabs daily for 4 days then 1 tab daily for 4 days then 1/2 tab daily for 4 days, 42 tab(s), 0 Refill(s) ; Ordering Provider:   Jose Dela Cruz MD; Catalog Code:   predniSONE ; Order Dt/Tm:   11/5/2019 11:17:48 AM CST           predniSONE  :   predniSONE ; Status:   Completed ; Ordered As Mnemonic:   predniSONE 20 mg oral tablet ; Simple Display Line:   40 mg, 2 tab(s), PO, Daily, for 5 day(s), 10 tab(s), 0 Refill(s) ; Ordering Provider:   Jose Dela Cruz MD; Catalog Code:   predniSONE ; Order Dt/Tm:   12/31/2019 2:57:32 PM CST          sildenafil  :   sildenafil ; Status:   Completed ; Ordered As Mnemonic:   Viagra 100 mg oral tablet ; Simple Display Line:   100 mg, 1 tab(s), PO, Daily, PRN: Other (see comment), 6 tab(s), 5 Refill(s) ; Ordering Provider:   Jose Dela Cruz MD; Catalog Code:   sildenafil ; Order Dt/Tm:   3/21/2016 3:51:34 PM CDT          sildenafil  :   sildenafil ; Status:   Completed ; Ordered As Mnemonic:   Viagra 100 mg oral tablet ; Simple Display Line:   100 mg, 1 tab(s), po, daily, 6 tab(s) ; Ordering Provider:   Jose Dela Cruz MD; Catalog Code:   sildenafil ; Order Dt/Tm:   8/28/2014 3:34:56 PM CDT            Home Meds    ibuprofen  :   ibuprofen ; Status:   Completed ; Ordered As Mnemonic:   ibuprofen ; Simple Display Line:   prn ; Catalog Code:   ibuprofen ; Order Dt/Tm:   8/28/2014 3:14:22 PM CDT          traMADol  :   traMADol ; Status:   Completed ; Ordered As Mnemonic:   traMADol 50 mg oral tablet ; Simple Display Line:   50 mg, 1 tab(s), PO, q4hr, PRN: for pain, 60 tab(s), 0 Refill(s) ; Catalog Code:   traMADol ; Order Dt/Tm:   11/20/2017 8:05:01 AM CST

## 2022-02-15 NOTE — LETTER
(Inserted Image. Unable to display)   May 19, 2021  DERICK ADAMS  [address]  [place] [address]          Dear DERICK,      Thank you for selecting [facility] for your healthcare needs.    Our records indicate you are due for the following services:     Follow-up office visit     (FYI   Regarding office visits: In some instances, a video visit or telephone visit may be offered as an option.)        To schedule an appointment or if you have further questions, please contact your clinic at [phone].      Powered by [employer]    Sincerely,    Jose Dela Cruz M.D.

## 2022-02-15 NOTE — PROGRESS NOTES
Patient:   DERICK ADAMS            MRN: [id number]            FIN: [id number]               Age:   54 years     Sex:  Male     :  1965   Associated Diagnoses:   Low back pain   Author:   Jose Dela Cruz MD      Visit Information      Date of Service: 2020 02:00 pm  Performing Location: [facility]  Encounter#: [id number]      Primary Care Provider (PCP):  Jose Dela Cruz MD    NPI# [id number]      Referring Provider:  Jose Dela Cruz MD    NPI# [id number]      Chief Complaint   3/5/2020 2:04 PM CST     Work Comp; F/U Back Injury, not much improvement; had seen Dr. Paris and he prescribed medications     Chief complaint and symptoms noted above confirmed with patient.      History of Present Illness             The patient presents with back pain.  The back pain is lumbar region and sacral region.  The back pain is described as aching.  The severity of the back pain is moderate.  The back pain is constant.  The back pain has lasted for 6 month(s).  Radiation of pain: to the lower extremities.  Exacerbating factors consist of none.  Relieving factors consist of analgesics and medication.  Associated symptoms consist of denies bladder dysfunction and denies bowel dysfunction.        Review of Systems   Constitutional:  Negative.    Musculoskeletal:  Negative except as documented in history of present illness.       Physical Examination   Vital Signs   3/5/2020 2:04 PM CST Temperature Temporal 97.0 DegF  LOW    Peripheral Pulse Rate 100 bpm    Pulse Site Radial artery    HR Method Electronic    Systolic Blood Pressure 174 mmHg  HI    Diastolic Blood Pressure 76 mmHg    Mean Arterial Pressure 109 mmHg    BP Site Right arm    BP Method Manual    Oxygen Saturation 99 %      Documented vital signs:       Blood Pressure: Systolic  136  mmHg, Diastolic  84  mmHg.    General:  Alert and oriented, No acute distress.    Respiratory:  Lungs are clear to auscultation, Respirations are  non-labored.    Cardiovascular:  Normal rate, Regular rhythm.    Musculoskeletal:       Spine/torso exam: Lumbar ( Tenderness, Pain, No numbness, No tingling, Normal range of motion ).    Integumentary:  Warm, Dry, Pink.       Impression and Plan   Diagnosis     Low back pain ([id number]-JN M54.5).     Course:  Progressing as expected.    Orders     Orders   Lab (Gen Lab  Reference Lab):  Basic Metabolic Panel* ([facility]) (Order): Specimen Type: Serum, Collection Date: 3/5/2020 2:22 PM CST  Pharmacy:  ibuprofen 800 mg oral tablet (Prescribe): = 1 tab(s) ( 800 mg ), Oral, q6 hrs, x 30 day(s), PRN: as needed for pain, # 120 tab(s), 1 Refill(s), Type: Acute, Pharmacy: [facility] [place], 1 tab(s) Oral q6 hrs,x30 day(s),PRN:as needed for pain.     Orders (Selected)   Outpatient Orders  Ordered  Referral (Request): 03/05/20 14:15:00 CST, Referred to: Orthopaedics, Referred to: Ortho Back, Reason for referral: Work Comp Injury, Low back pain  Referral (Request): 03/05/20 14:17:00 CST, Referred to: Physical Therapy, Reason for referral: Eval and treat low back pain, Low back pain.        Professional Services   Counseling Summary:  This was a 25 minute visit with greater than 50% of that time spent counseling the patient, and coordination of care..    Counseling included treatment options, risks and benefits, lifestyle changes, prognosis, current condition, medications, and dose adjustments.    The patient was interactive, attentive, asked questions, and verbalized understanding.

## 2022-02-15 NOTE — NURSING NOTE
Comprehensive Intake Entered On:  12/31/2019 2:28 PM CST    Performed On:  12/31/2019 2:22 PM CST by Garima Leach MA               Summary   Chief Complaint :   Work Comp follow up Back pain   Menstrual Status :   N/A   Weight Measured :   158.6 lb(Converted to: 158 lb 10 oz, 71.94 kg)    Height Measured :   65.5 in(Converted to: 5 ft 5 in, 166.37 cm)    Body Mass Index :   25.99 kg/m2 (HI)    Body Surface Area :   1.82 m2   Systolic Blood Pressure :   148 mmHg (HI)    Diastolic Blood Pressure :   90 mmHg (HI)    Mean Arterial Pressure :   109 mmHg   Peripheral Pulse Rate :   100 bpm   BP Site :   Right arm   Pulse Site :   Radial artery   BP Method :   Manual   HR Method :   Manual   Temperature Tympanic :   98.8 DegF(Converted to: 37.1 DegC)    Garima Leach MA - 12/31/2019 2:22 PM CST   Health Status   Allergies Verified? :   Yes   Medication History Verified? :   Yes   Medical History Verified? :   Yes   Pre-Visit Planning Status :   Completed   Tobacco Use? :   Current every day smoker   Garima Leach MA - 12/31/2019 2:22 PM CST   Consents   Consent for Immunization Exchange :   Consent Granted   Consent for Immunizations to Providers :   Consent Granted   Garima Leach MA - 12/31/2019 2:22 PM CST   Meds / Allergies   (As Of: 12/31/2019 2:28:15 PM CST)   Allergies (Active)   penicillin  Estimated Onset Date:   Unspecified ; Created By:   Michelle Bonilla; Reaction Status:   Active ; Category:   Drug ; Substance:   penicillin ; Type:   Allergy ; Updated By:   Michelle Bonilla; Reviewed Date:   12/31/2019 2:25 PM CST        Medication List   (As Of: 12/31/2019 2:28:15 PM CST)   Prescription/Discharge Order    cyclobenzaprine  :   cyclobenzaprine ; Status:   Prescribed ; Ordered As Mnemonic:   cyclobenzaprine 10 mg oral tablet ; Simple Display Line:   10 mg, 1 tab(s), PO, TID, PRN: for spasm, 30 tab(s), 0 Refill(s) ; Ordering Provider:   Jose Dela Cruz MD; Catalog Code:   cyclobenzaprine ; Order Dt/Tm:    10/18/2019 4:20:48 PM CDT

## 2022-02-15 NOTE — NURSING NOTE
Comprehensive Intake Entered On:  3/5/2020 2:09 PM CST    Performed On:  3/5/2020 2:04 PM CST by Ramya Aguilar CMA               Summary   Chief Complaint :   Work Comp; F/U Back Injury, not much improvement; had seen Dr. Paris and he prescribed medications    Menstrual Status :   N/A   Weight Measured :   165 lb(Converted to: 165 lb 0 oz, 74.84 kg)    Height Measured :   65.5 in(Converted to: 5 ft 5 in, 166.37 cm)    Body Mass Index :   27.04 kg/m2 (HI)    Body Surface Area :   1.86 m2   Systolic Blood Pressure :   174 mmHg (HI)    Diastolic Blood Pressure :   76 mmHg   Mean Arterial Pressure :   109 mmHg   Peripheral Pulse Rate :   100 bpm   BP Site :   Right arm   Pulse Site :   Radial artery   BP Method :   Manual   HR Method :   Electronic   Temperature Temporal :   97.0 DegF(Converted to: 36.1 DegC)  (LOW)    Oxygen Saturation :   99 %   Ramya Aguilar CMA - 3/5/2020 2:04 PM CST   Health Status   Allergies Verified? :   Yes   Medication History Verified? :   Yes   Medical History Verified? :   Yes   Pre-Visit Planning Status :   Completed   Tobacco Use? :   Current every day smoker   Tobacco Cessation Review :   Advised to quit   Ramya Aguilar CMA - 3/5/2020 2:04 PM CST   Consents   Consent for Immunization Exchange :   Consent Granted   Consent for Immunizations to Providers :   Consent Granted   Ramya Aguilar CMA - 3/5/2020 2:04 PM CST   Meds / Allergies   (As Of: 3/5/2020 2:09:08 PM CST)   Allergies (Active)   penicillin  Estimated Onset Date:   Unspecified ; Created By:   Michelle Vázquez; Reaction Status:   Active ; Category:   Drug ; Substance:   penicillin ; Type:   Allergy ; Updated By:   Michelle Vázquez; Reviewed Date:   3/5/2020 2:05 PM CST        Medication List   (As Of: 3/5/2020 2:09:08 PM CST)   Home Meds    baclofen  :   baclofen ; Status:   Documented ; Ordered As Mnemonic:   baclofen ; Simple Display Line:   Oral, tid, 0 Refill(s) ; Catalog Code:   baclofen ; Order Dt/Tm:   3/5/2020 2:06:41 PM CST           celecoxib  :   celecoxib ; Status:   Documented ; Ordered As Mnemonic:   CeleBREX ; Simple Display Line:   Oral, 0 Refill(s) ; Catalog Code:   celecoxib ; Order Dt/Tm:   3/5/2020 2:06:51 PM CST          gabapentin  :   gabapentin ; Status:   Documented ; Ordered As Mnemonic:   gabapentin 600 mg oral tablet ; Simple Display Line:   600 mg, 1 tab(s), Oral, tid, 0 Refill(s) ; Catalog Code:   gabapentin ; Order Dt/Tm:   3/5/2020 2:07:03 PM CST          oxyCODONE  :   oxyCODONE ; Status:   Documented ; Ordered As Mnemonic:   oxyCODONE 5 mg oral capsule ; Simple Display Line:   5 mg, 1 cap(s), Oral, q6 hrs, 0 Refill(s) ; Catalog Code:   oxyCODONE ; Order Dt/Tm:   3/5/2020 2:06:57 PM CST

## 2022-02-15 NOTE — NURSING NOTE
Comprehensive Intake Entered On:  5/14/2020 11:03 AM CDT    Performed On:  5/14/2020 11:00 AM CDT by Garima Leach MA               Summary   Chief Complaint :   Pain Management med check; Verbal permission for telephone visit    Menstrual Status :   N/A   Height Measured :   65.5 in(Converted to: 5 ft 5 in, 166.37 cm)    aGrima Leach MA - 5/14/2020 11:00 AM CDT   Health Status   Allergies Verified? :   Yes   Medication History Verified? :   Yes   Medical History Verified? :   Yes   Pre-Visit Planning Status :   Completed   Tobacco Use? :   Current every day smoker   Garima Leach MA - 5/14/2020 11:00 AM CDT   Consents   Consent for Immunization Exchange :   Consent Granted   Consent for Immunizations to Providers :   Consent Granted   Garima Leach MA - 5/14/2020 11:00 AM CDT   Meds / Allergies   (As Of: 5/14/2020 11:03:19 AM CDT)   Allergies (Active)   penicillin  Estimated Onset Date:   Unspecified ; Created By:   Michelle Bonilla; Reaction Status:   Active ; Category:   Drug ; Substance:   penicillin ; Type:   Allergy ; Updated By:   Michelle Bonilla; Reviewed Date:   5/14/2020 11:02 AM CDT        Medication List   (As Of: 5/14/2020 11:03:19 AM CDT)   Home Meds    ibuprofen  :   ibuprofen ; Status:   Documented ; Ordered As Mnemonic:   ibuprofen ; Simple Display Line:   800 mg, q6 hrs, 0 Refill(s) ; Catalog Code:   ibuprofen ; Order Dt/Tm:   5/14/2020 11:01:46 AM CDT          baclofen  :   baclofen ; Status:   Documented ; Ordered As Mnemonic:   baclofen ; Simple Display Line:   10 mg, Oral, tid, 0 Refill(s) ; Catalog Code:   baclofen ; Order Dt/Tm:   3/5/2020 2:06:41 PM CST          oxyCODONE  :   oxyCODONE ; Status:   Documented ; Ordered As Mnemonic:   oxyCODONE 5 mg oral capsule ; Simple Display Line:   5 mg, 1 cap(s), Oral, q6 hrs, 0 Refill(s) ; Catalog Code:   oxyCODONE ; Order Dt/Tm:   3/5/2020 2:06:57 PM CST            ID Risk Screen   Recent Travel History :   No recent travel   Family Member Travel  History :   No recent travel   Other Exposure to Infectious Disease :   Unknown   Garima Leach MA - 5/14/2020 11:00 AM CDT

## 2022-02-15 NOTE — TELEPHONE ENCOUNTER
---------------------From: Ileana Mcgowan To:  <[email or web address]>;   Sent: 11/7/2019 5:58:34 AM CSTSubject: General Message Patient: DERICK ADAMS; YOB: 1965 The attached Referral Note is for an appointment to be scheduled.  Referral and WC info faxed to [facility], they will contact patient to schedule.

## 2022-02-15 NOTE — NURSING NOTE
Depression Screening Entered On:  6/18/2021 12:01 PM CDT    Performed On:  6/18/2021 12:01 PM CDT by Olivia Razo CMA               Depression Screening   Little Interest - Pleasure in Activities :   Several days   Feeling Down, Depressed, Hopeless :   Not at all   Initial Depression Screen Score :   1 Score   Poor Appetite or Overeating :   Not at all   Trouble Falling or Staying Asleep :   Several days   Feeling Tired or Little Energy :   Several days   Feeling Bad About Yourself :   Not at all   Trouble Concentrating :   Several days   Moving or Speaking Slowly :   Not at all   Thoughts Better Off Dead or Hurting Self :   Not at all   Difficulty at Work, Home, Getting Along :   Not difficult at all   Detailed Depression Screen Score :   3    Total Depression Screen Score :   4    Olivia Razo CMA - 6/18/2021 12:01 PM CDT

## 2022-02-15 NOTE — PROGRESS NOTES
Patient:   DERICK ADAMS            MRN: [id number]            FIN: [id number]               Age:   52 years     Sex:  Male     :  1965   Associated Diagnoses:   Low back pain   Author:   Jose Dela Cruz MD      Visit Information      Date of Service: 2018 03:14 pm  Performing Location: [facility]  Encounter#: [id number]      Primary Care Provider (PCP):  Jose Dela Cruz MD    NPI# [id number]      Referring Provider:  Jose Dela Cruz MD    NPI# [id number]      Chief Complaint   3/8/2018 3:23 PM CST     f/u ER visit - back pain     Chief complaint and symptoms noted above confirmed with patient.      History of Present Illness             The patient presents with back pain.  The back pain is generalized.  The severity of the back pain is moderate.  The back pain is constant.  Exacerbating factors consist of none.  Relieving factors consist of analgesics.  Associated symptoms consist of denies bladder dysfunction and denies bowel dysfunction.        Review of Systems   Constitutional:  Negative.    Musculoskeletal:       Back pain: Bilaterally, In the lower region, Not radiating.       Health Status   Allergies:    Allergic Reactions (Selected)  Severity Not Documented  Penicillin (No reactions were documented)   Problem list:    All Problems  Tobacco abuse / SNOMED CT 440552496 / Confirmed      Histories   Past Medical History:    Active  Tobacco abuse (SNOMED CT 345188787)   Family History:    No family history items have been selected or recorded.   Procedure history:    No active procedure history items have been selected or recorded.   Social History:        Alcohol Assessment: Denies Alcohol Use            Never      Tobacco Assessment: Current            Cigarettes, 20 per day.      Substance Abuse Assessment: Denies Substance Abuse            Never      Employment and Education Assessment            Work/School description: Construction.      Home and Environment  Assessment            Marital status: Single.  Spouse/Partner name: Carito Noland.      Nutrition and Health Assessment            Type of diet: Regular.      Exercise and Physical Activity Assessment: Does not exercise            Exercise frequency: Physical job.      Sexual Assessment            Sexually active: Yes.  Sexual orientation: Heterosexual.        Physical Examination   Vital Signs   3/8/2018 3:23 PM CST Temperature Tympanic 97.6 DegF  LOW    Peripheral Pulse Rate 108 bpm  HI    Pulse Site Radial artery    HR Method Manual    Systolic Blood Pressure 164 mmHg  HI    Diastolic Blood Pressure 92 mmHg  HI    Mean Arterial Pressure 116 mmHg    BP Site Left arm    BP Method Manual      General:  Alert and oriented, No acute distress.    Eye:  Normal conjunctiva.    HENT:  Normocephalic.    Neck:  Supple.    Respiratory:  Lungs are clear to auscultation, Respirations are non-labored.    Cardiovascular:  Normal rate, Regular rhythm, No murmur.    Gastrointestinal:  Soft, Non-tender.    Musculoskeletal:  Normal range of motion.         Spine/torso exam: Lumbar ( Bilateral, Tenderness, Straight leg raising, sitting/distracted ( Negative ) ).    Integumentary:  Warm, Dry, Pink.       Impression and Plan   Diagnosis     Low back pain (XYM90-EB M54.5).     Patient Instructions:       Counseled: Patient, Verbalized understanding.    Orders     Orders   Pharmacy:  cyclobenzaprine 10 mg oral tablet (Prescribe): 1 tab(s) ( 10 mg ), PO, qhs, PRN: for spasm, # 30 tab(s), 0 Refill(s), Type: Maintenance, Pharmacy: [id number], 1 tab(s) po qhs,PRN:for spasm  meloxicam 15 mg oral tablet (Prescribe): 1 tab(s) ( 15 mg ), PO, Daily, # 30 tab(s), 0 Refill(s), Type: Maintenance, Pharmacy: [id number], 1 tab(s) po daily.

## 2022-02-15 NOTE — NURSING NOTE
Comprehensive Intake Entered On:  2/18/2021 4:37 PM CST    Performed On:  2/18/2021 4:32 PM CST by Marky Strickland CMA               Summary   Chief Complaint :   Pt here for FU on low back pain.   Menstrual Status :   N/A   Weight Measured :   168 lb(Converted to: 168 lb 0 oz, 76.204 kg)    Height Measured :   65.5 in(Converted to: 5 ft 5 in, 166.37 cm)    Body Mass Index :   27.53 kg/m2 (HI)    Body Surface Area :   1.87 m2   Height/Length Estimated :   65.5 in(Converted to: 5 ft 5 in, 166.37 cm)    Systolic Blood Pressure :   152 mmHg (HI)    Diastolic Blood Pressure :   86 mmHg (HI)    Mean Arterial Pressure :   108 mmHg   Peripheral Pulse Rate :   100 bpm   BP Site :   Right arm   Pulse Site :   Radial artery   Temperature Tympanic :   98 DegF(Converted to: 36.7 DegC)    Respiratory Rate :   16 br/min   Marky Strickland CMA - 2/18/2021 4:32 PM CST   Health Status   Allergies Verified? :   Yes   Medication History Verified? :   Yes   Medical History Verified? :   Yes   Pre-Visit Planning Status :   Not completed   Tobacco Use? :   Current every day smoker   Tobacco Cessation Review :   Not ready to quit   Marky Strickland CMA - 2/18/2021 4:32 PM CST   Meds / Allergies   (As Of: 2/18/2021 4:37:47 PM CST)   Allergies (Active)   penicillin  Estimated Onset Date:   Unspecified ; Created By:   Michelle Bonilla; Reaction Status:   Active ; Category:   Drug ; Substance:   penicillin ; Type:   Allergy ; Updated By:   Michelle Bonilla; Reviewed Date:   10/23/2020 1:07 PM CDT        Medication List   (As Of: 2/18/2021 4:37:47 PM CST)   Prescription/Discharge Order    ibuprofen  :   ibuprofen ; Status:   Prescribed ; Ordered As Mnemonic:   ibuprofen 800 mg oral tablet ; Simple Display Line:   1 tab(s), Oral, q8 hrs, AS DIRECTED BY PROVIDER., 30 tab(s), 1 Refill(s) ; Ordering Provider:   Jose Dela Cruz MD; Catalog Code:   ibuprofen ; Order Dt/Tm:   11/23/2020 3:50:28 PM CST          baclofen  :   baclofen ; Status:   Prescribed ;  Ordered As Mnemonic:   baclofen 10 mg oral tablet ; Simple Display Line:   10 mg, 1 tab(s), Oral, tid, PRN: as needed for muscle spasm, 90 tab(s), 1 Refill(s) ; Ordering Provider:   Jose Dela Cruz MD; Catalog Code:   baclofen ; Order Dt/Tm:   5/15/2020 4:06:42 PM CDT            ID Risk Screen   Recent Travel History :   No recent travel   Family Member Travel History :   No recent travel   Other Exposure to Infectious Disease :   Unknown   COVID-19 Testing Status :   No COVID-19 test performed   Marky Strickland CMA - 2/18/2021 4:32 PM CST   Social History   Social History   (As Of: 2/18/2021 4:37:47 PM CST)   Alcohol:  Denies Alcohol Use      Never   (Last Updated: 9/2/2014 2:19:00 PM CDT by Melody Boss)          Tobacco:  Current      10 or more cigarettes (1/2 pack or more)/day in last 30 days, Cigarettes, 20 per day.   (Last Updated: 2/18/2021 4:35:45 PM CST by Marky Strickland CMA)          Electronic Cigarette/Vaping:        Electronic Cigarette Use: Never.   (Last Updated: 2/18/2021 4:32:59 PM CST by Marky Strickland CMA)          Substance Abuse:  Denies Substance Abuse      Never   (Last Updated: 9/2/2014 2:18:38 PM CDT by Melody Boss)          Employment/School:        Work/School description: Construction.   (Last Updated: 9/2/2014 2:17:09 PM CDT by Melody Boss)          Home/Environment:        Marital status: Single.  Spouse/Partner name: Carito Noland.   (Last Updated: 9/2/2014 2:17:01 PM CDT by Melody Boss)          Nutrition/Health:        Type of diet: Regular.   (Last Updated: 9/2/2014 2:18:49 PM CDT by Melody Boss)          Exercise:  Does not exercise      Exercise frequency: Physical job.   (Last Updated: 9/2/2014 2:18:09 PM CDT by Melody Boss)          Sexual:        Sexually active: Yes.  Sexual orientation: Heterosexual.   (Last Updated: 9/2/2014 2:18:21 PM CDT by Melody Boss)

## 2022-02-15 NOTE — TELEPHONE ENCOUNTER
---------------------  From: Phyllis Posada CMA   To: [facility] Message Pool ([id number]);     Cc: Patience Sullivan;      Sent: 5/8/2020 9:20:34 AM CDT  Subject: Records/[facility]     Kayode Gonzalez called @ [phone] stating she is still waiting to receive patients records for work comp case. Recent faxes scanned in chart (attached). Was this done. Fax # [phone] and telephone # [phone]---------------------  From: Patience Sullivan   To: Phyllis Posada CMA;     Sent: 5/8/2020 9:54:19 AM CDT  Subject: RE: Records/[facility]     This was not done. I faxed the request back to them on 03/26/2020 telling them that the patient needed to actually sign the release form or we couldn't send records. They never sent this back. I will fax it to them again now. thanksnoted

## 2022-02-15 NOTE — PROGRESS NOTES
Chief Complaint    c/o congestion, cough x 4 days  History of Present Illness      Patient comes in today for a couple reasons.  First she has had a upper respiratory infection and would like to get that examined.      In addition to this she has had problems with urine drug screening when he is been incarcerated.  It is coming back positive for methamphetamine but negative for amphetamine.  He would like a second opinion as he states he is not using any methamphetamine or amphetamines.  Review of Systems      Review of systems is otherwise negative or chills.  He has been coughing.  It is productive of a clear/greenish phlegm.  Physical Exam   Vitals & Measurements    T: 98.4(Tympanic)  HR: 116(Peripheral)  BP: 148/84  SpO2: 97%     HT: 65.5 in  WT: 167 lb  BMI: 27.36       Physical exam shows vital signs stable afebrile no apparent distress.      HEENT shows TMs clear nose congested pharynx moist neck supple lungs clear CV regular.  Assessment/Plan       Positive urine drug screen         We will do a urine drug screen today.  I will observe the screen making sure it is done correctly and witnessed.  Will report back to him with the results.         Ordered:          Drug Abuse Panel 10-50, with Confirmation, Urine* ([facility]), Specimen Type: Urine, Collection Date: 11/02/17 11:50:00 CDT                Orders:         sildenafil, 1 tab(s) ( 100 mg ), PO, Daily, PRN: Other (see comment), # 6 tab(s), 5 Refill(s), Type: Maintenance   Acute upper respiratory infection   Plan: Symptomatic treatment, push fluids recheck as needed.  Patient Information     Name:DERICK ADAMS      Address:      [address] [place] [address]     Sex:Male     YOB: 1965     [phone]     Emergency Contact:PIETRO CARTER     MRN:[id number]     FIN:[id number]     Location:[facility] [place]     Date of Service:11/02/2017      Primary Care Physician:       Jose Dela Cruz MD, [phone]  [phone]  Problem List/Past Medical History    Ongoing     Tobacco abuse    Historical  Medications   No active medications  Allergies    penicillin  Social History    Smoking Status - 11/02/2017     Current every day smoker     Alcohol - Denies Alcohol Use, 09/02/2014      Never     Employment and Education - 09/02/2014      Work/School description: Construction.     Exercise and Physical Activity - Does not exercise, 09/02/2014      Exercise frequency: Physical job.     Home and Environment - 09/02/2014      Marital status: Single. Spouse/Partner name: Carito Noland.     Nutrition and Health - 09/02/2014      Type of diet: Regular.     Sexual - 09/02/2014      Sexually active: Yes. Sexual orientation: Heterosexual.     Substance Abuse - Denies Substance Abuse, 09/02/2014      Never     Tobacco - Current, 09/02/2014      Cigarettes, 20 per day.  Lab Results      Results (Last 90 days)      No results located.

## 2022-02-15 NOTE — PROGRESS NOTES
Patient:   DERICK ADAMS            MRN: [id number]            FIN: [id number]               Age:   54 years     Sex:  Male     :  1965   Associated Diagnoses:   None   Author:   Jose Dela Cruz MD       -   Today's date:  2019 2:58:00 PM .        -   To whom it may concern:        This patient is currently under my care and Was seen in my office on  2019.  .     Please excuse him/ her from work, until further notice.  Please contact me if you have any questions or concerns.      -   Sincerely,             Jose Dela Cruz MD  [address]  [id number] [phone]

## 2022-02-15 NOTE — LETTER
(Inserted Image. Unable to display)   November 19, 2019      DERICK ADAMS  [address]  [place] [address]        Dear DERICK,      Thank you for selecting [facility] ([facility]viously [facility]) for your healthcare needs.     Our records indicate you are due for the following services:     Follow-up office visit     To schedule an appointment or if you have further questions, please contact your primary clinic:   [facility]          [phone]   [facility]    [phone]             [facility]         [phone]      Powered by [employer]    Sincerely,    Jose Dela Cruz M.D.

## 2022-02-15 NOTE — PROGRESS NOTES
Patient:   DERICK ADAMS            MRN: [id number]            FIN: [id number]               Age:   54 years     Sex:  Male     :  1965   Associated Diagnoses:   Low back pain   Author:   Jose Dela Cruz MD      Visit Information      Date of Service: 2019 10:20 am  Performing Location: [facility]  Encounter#: [id number]      Primary Care Provider (PCP):  Jose Dela Cruz MD    NPI# [id number]      Referring Provider:  Jose Dela Cruz MD    NPI# [id number]      Chief Complaint   2019 10:53 AM CST   WORK COMP : Back pain; no improvement     Chief complaint and symptoms noted above confirmed with patient.      History of Present Illness             The patient presents with back pain.  The back pain is lumbar region.  The back pain is described as aching.  The severity of the back pain is moderate.  The back pain is constant.  The context of the back pain: occurred in association with work.  Exacerbating factors consist of movement, raising legs in supine position and standing.  Relieving factors consist of none.  Associated symptoms consist of denies bladder dysfunction and denies bowel dysfunction.        Review of Systems   Constitutional:  Negative.    Musculoskeletal:  Negative except as documented in history of present illness.       Health Status   Allergies:    Allergies reviewed.   Medications:  (Selected)   Prescriptions  Prescribed  cyclobenzaprine 10 mg oral tablet: = 1 tab(s) ( 10 mg ), PO, TID, PRN: for spasm, # 30 tab(s), 0 Refill(s), Type: Maintenance, Pharmacy: [facility], 1 tab(s) Oral tid,PRN:for spasm  indomethacin 50 mg oral capsule: = 1 cap(s) ( 50 mg ), Oral, tid, PRN: for pain, # 40 EA, 0 Refill(s), Type: Maintenance, Pharmacy: [facility], 1 cap(s) Oral tid,PRN:for pain   Problem list:    Problem list reviewed.      Physical Examination   Vital Signs   2019 10:53 AM CST Peripheral Pulse Rate  100 bpm    Systolic Blood Pressure 128 mmHg    Diastolic Blood Pressure 80 mmHg    Mean Arterial Pressure 96 mmHg    Oxygen Saturation 98 %      General:  Alert and oriented, No acute distress.    Respiratory:  Lungs are clear to auscultation.    Cardiovascular:  Normal rate.    Gastrointestinal:  Soft, Non-tender.    Musculoskeletal:       Spine/torso exam: Lumbar ( Tenderness, Straight leg raising, sitting/distracted ( Positive ), bilaterally ).    Integumentary:  Warm, Dry, Pink, No rash.       Impression and Plan   Diagnosis     Low back pain (KND71-LA M54.5).     Course:  Not improving.    Orders     Orders   Pharmacy:  predniSONE 10 mg oral tablet (Prescribe): See Instructions, Instructions: 4 tabs daily for 4 days then 3 tabs daily for 4 days then 2 tabs daily for 4 days then 1 tab daily for 4 days then 1/2 tab daily for 4 days, # 42 tab(s), 0 Refill(s), Type: Acute, Pharmacy: [facility], 4 tabs daily for 4 days then 3 tabs daily for 4 days then 2 tabs daily for 4 days then 1 tab daily for 4 days then 1/2 tab daily for 4 days.     Orders (Selected)   Outpatient Orders  Ordered  Referral (Request): 11/05/19 11:12:00 CST, Referred to: Orthopaedics, Referred to: [facility], Reason for referral: Low Back Pain not improving, Low back pain.     Orders     Orders   Requests (Return to Office):  Return to Clinic (Request) (Order): Return in 2 weeks.

## 2022-02-25 ENCOUNTER — AMBULATORY - RIVER FALLS (OUTPATIENT)
Dept: FAMILY MEDICINE | Facility: CLINIC | Age: 57
End: 2022-02-25

## 2022-03-02 VITALS
HEIGHT: 66 IN | BODY MASS INDEX: 27.32 KG/M2 | SYSTOLIC BLOOD PRESSURE: 150 MMHG | TEMPERATURE: 97.8 F | WEIGHT: 170 LBS | DIASTOLIC BLOOD PRESSURE: 82 MMHG

## 2022-03-02 NOTE — TELEPHONE ENCOUNTER
---------------------  From: Liya Warren RN   To: [employer] Message Pool ([id number]_[employer]);     Sent: 1/31/2022 3:42:27 PM CST  Subject: Disabilty paper work     Time of Call:  1331  Return call at:1530     Person Calling:  Significant other  Phone number:  [phone]    Note:   Patient needs office notes fro Oct to present and workability forms faxed to [facility] at   Atten: case [id number]    Last office visit and reason:  Back Pain---------------------  From: Phyllis Posada CMA ([employer] Message Pool ([id number]_[place]))   To: Liya Warren RN;     Sent: 2/1/2022 9:36:51 AM CST  Subject: RE: Disabilty paper work     What workability forms? We do not have any scanned.---------------------  From: Phyllis Posada CMA ([facility] Message Pool ([id number]_[employer]))   To: Yasmine Valdez;     Sent: 2/1/2022 9:36:59 AM CST  Subject: FW: Disabilty paper work---------------------  From: Yasmnie Valdez   To: [employer] Message Pool ([id number]);     Sent: 2/1/2022 10:24:46 AM CST  Subject: RE: Disabilty paper work     I have received any forms for this patient or a signed authorization/request to send records.---------------------  From: Yasmine Valdez   To: [employer] Message Pool ([id number]_[place]);     Sent: 2/1/2022 12:13:09 PM CST  Subject: RE: Disabilty paper work     have not**Calling [facility] again now, due extremely long wait time last week. Patient knows [facility] is requesting they re-fax capacity form as T has not received.Spoke with Kathia @ [employer]. Confirmed our fax and requested they send with urgency. She confirmed with no questions.---------------------  From: Yasmine Valdez   To: [facility] Message Pool ([id number]_[facility]);     Sent: 2/1/2022 2:22:13 PM CST  Subject: RE: Disabilty paper work     Received paperwork and signed CAMILO.  Scanned CAMILO and created chart alert and printed records and routed to [facility] to fill out form.Fax completed and signed per T. Sent back for faxing  and scanning---------------------  From: Phyllis Posada CMA ([employer] Message Pool ([id number]_[facility]))   To: Yasmine Valdez;     Sent: 2/1/2022 2:37:05 PM CST  Subject: FW: Disabilty paper workLDVM informing patient---------------------  From: Yasmine Valdez   To: [employer] Message Pool ([id number]_[facility]);     Sent: 2/1/2022 2:58:45 PM CST  Subject: RE: Disabilty paper work     Faxed Form and records to [facility] @ [phone] - confirmation received @ 2:54 pm.

## 2022-03-02 NOTE — TELEPHONE ENCOUNTER
---------------------  From: Phyllis Posada CMA   To: Referral Coordinators Pool ([id number]_[facility]);     Sent: 1/28/2022 4:33:40 PM CST !  Subject: Ortho per C[facility]     Please fax Ortho referral from 10/21 per [facility]. There is no documentation that this was done. Patient was given tele # to facility which is attached to order. Please have them call patient. ThanksOrder was faxed to [facility] @[phone] they will contact patient to schedule.

## 2022-03-02 NOTE — NURSING NOTE
Comprehensive Intake Entered On:  1/28/2022 3:47 PM CST    Performed On:  1/28/2022 3:41 PM CST by Pyhllis Posada CMA               Summary   Chief Complaint :   Follow up back pain. Left ear plugged with pain x4 days.   Menstrual Status :   N/A   Weight Measured :   170 lb(Converted to: 170 lb 0 oz, 77.111 kg)    Height Measured :   65.5 in(Converted to: 5 ft 5 in, 166.37 cm)    Body Mass Index :   27.86 kg/m2 (HI)    Body Surface Area :   1.89 m2   Height/Length Estimated :   65.5 in(Converted to: 5 ft 5 in, 166.37 cm)    Systolic Blood Pressure :   150 mmHg (HI)    Diastolic Blood Pressure :   82 mmHg (HI)    Mean Arterial Pressure :   105 mmHg   BP Site :   Right arm   BP Method :   Manual   Temperature Tympanic :   97.8 DegF(Converted to: 36.6 DegC)  (LOW)    Phyllis Posada CMA - 1/28/2022 3:41 PM CST   Health Status   Allergies Verified? :   Yes   Medication History Verified? :   Yes   Medical History Verified? :   Yes   Pre-Visit Planning Status :   Completed   Tobacco Use? :   Current every day smoker   Phyllis Posada CMA - 1/28/2022 3:41 PM CST   Consents   Consent for Immunization Exchange :   Consent Granted   Consent for Immunizations to Providers :   Consent Granted   Phyllis Posada CMA - 1/28/2022 3:41 PM CST   Meds / Allergies   (As Of: 1/28/2022 3:47:18 PM CST)   Allergies (Active)   penicillin  Estimated Onset Date:   Unspecified ; Created By:   Michelle Bonilla; Reaction Status:   Active ; Category:   Drug ; Substance:   penicillin ; Type:   Allergy ; Updated By:   Michelle Bonilla; Reviewed Date:   1/28/2022 3:44 PM CST        Medication List   (As Of: 1/28/2022 3:47:18 PM CST)   Prescription/Discharge Order    baclofen  :   baclofen ; Status:   Prescribed ; Ordered As Mnemonic:   baclofen 10 mg oral tablet ; Simple Display Line:   10 mg, 1 tab(s), Oral, tid, PRN: as needed for muscle spasm, 90 tab(s), 1 Refill(s) ; Ordering Provider:   Jose Dela Cruz MD; Catalog Code:   baclofen ; Order Dt/Tm:    6/18/2021 11:52:38 AM CDT          oxyCODONE  :   oxyCODONE ; Status:   Prescribed ; Ordered As Mnemonic:   oxyCODONE 5 mg oral tablet ; Simple Display Line:   5 mg, 1 tab(s), Oral, q12 hrs, 15 tab(s), 0 Refill(s) ; Ordering Provider:   Jose Dela Cruz MD; Catalog Code:   oxyCODONE ; Order Dt/Tm:   10/22/2021 4:26:42 PM CDT

## 2022-03-02 NOTE — PROGRESS NOTES
Patient:   DERICK ADAMS            MRN: [id number]            FIN: [id number]               Age:   56 years     Sex:  Male     :  1965   Associated Diagnoses:   Low back pain; Otitis externa   Author:   Jose Dela Cruz MD      Visit Information      Date of Service: 2022 03:29 pm  Performing Location: [facility]  Encounter#: [id number]      Chief Complaint   2022 3:41 PM CST    Follow up back pain. Left ear plugged with pain x4 days.     Chief complaint and symptoms noted above confirmed with patient.      Subjective   Chief complaint 2022 3:41 PM CST    Follow up back pain. Left ear plugged with pain x4 days.  .     Low back pain.  Uses baclofen and oxycodone sparingly.  [place] Prescription Drug Monitoring Program checked and reviewed.    Left ear draining         Health Status   Allergies:    Allergic Reactions (Selected)  Severity Not Documented  Penicillin (No reactions were documented)   Medications:  (Selected)   Prescriptions  Prescribed  baclofen 10 mg oral tablet: = 1 tab(s) ( 10 mg ), Oral, tid, PRN: as needed for muscle spasm, # 90 tab(s), 1 Refill(s), Type: Maintenance, Pharmacy: [facility] #[id number], 1 tab(s) Oral tid,PRN:as needed for muscle spasm, 65.5, in, 22 15:41:00 CST, Height Measured, 1...  hydrocortisone/neomycin/polymyxin B 1%-0.35%-10,000 units/mL otic solution: 2 drop(s), Ear-Left, qid, x 7 day(s), # 10 mL, 0 Refill(s), Type: Acute, Pharmacy: [facility] #[id number], 2 drop(s) Ear-Left qid,x7 day(s), 65.5, in, 22 15:41:00 CST, Height Measured, 170, lb, 22 15:41:00 CST, Weight Measured  oxyCODONE 5 mg oral tablet: = 1 tab(s) ( 5 mg ), Oral, q12 hrs, # 15 tab(s), 0 Refill(s), Type: Acute, Pharmacy: [facility] #[id number], 1 tab(s) Oral q12 hrs, 65.5, in, 22 15:41:00 CST, Height Measured, 170, lb, 22 15:41:00 CST, Weight Measured   Problem list:    All Problems  Tobacco abuse / SNOMED CT 899042053 /  Confirmed      Objective   Vital Signs   1/28/2022 3:41 PM CST Temperature Tympanic 97.8 DegF  LOW    Systolic Blood Pressure 150 mmHg  HI    Diastolic Blood Pressure 82 mmHg  HI    Mean Arterial Pressure 105 mmHg    BP Site Right arm    BP Method Manual      Measurements from flowsheet : Measurements   1/28/2022 3:41 PM CST Height Measured - Standard 65.5 in    Height/Length Estimated 65.5 in    Weight Measured - Standard 170 lb    BSA 1.89 m2    Body Mass Index 27.86 kg/m2  HI      General:  Alert and oriented, No acute distress.    HENT:       Ear: Left ear, Canal ( puss in canal ).    Neck:  Supple.    Respiratory:  Lungs are clear to auscultation.    Cardiovascular:  Normal rate, Regular rhythm, No murmur.    Musculoskeletal:       Spine/torso exam: Lumbar ( Tenderness ).       Impression and Plan   Assessment and Plan:          Diagnosis: Low back pain (HCT32-ZM M54.5).         Course: Progressing as expected, Stable.    Orders     Orders (Selected)   Prescriptions  Prescribed  baclofen 10 mg oral tablet: = 1 tab(s) ( 10 mg ), Oral, tid, PRN: as needed for muscle spasm, # 90 tab(s), 1 Refill(s), Type: Maintenance, Pharmacy: [facility] #[id number], 1 tab(s) Oral tid,PRN:as needed for muscle spasm, 65.5, in, 01/28/22 15:41:00 CST, Height Measured, 1...  oxyCODONE 5 mg oral tablet: = 1 tab(s) ( 5 mg ), Oral, q12 hrs, # 15 tab(s), 0 Refill(s), Type: Acute, Pharmacy: [facility] #[id number], 1 tab(s) Oral q12 hrs, 65.5, in, 01/28/22 15:41:00 CST, Height Measured, 170, lb, 01/28/22 15:41:00 CST, Weight Measured.     .    Assessment and Plan:          Diagnosis: Otitis externa (SJP28-VM H60.90).         Course: Worsening.    Orders     Orders (Selected)   Prescriptions  Prescribed  hydrocortisone/neomycin/polymyxin B 1%-0.35%-10,000 units/mL otic solution: 2 drop(s), Ear-Left, qid, x 7 day(s), # 10 mL, 0 Refill(s), Type: Acute, Pharmacy: [facility] #[id number], 2 drop(s) Ear-Left qid,x7 day(s), 65.5,  in, 01/28/22 15:41:00 CST, Height Measured, 170, lb, 01/28/22 15:41:00 CST, Weight Measured.     .

## 2022-11-15 ENCOUNTER — OFFICE VISIT (OUTPATIENT)
Dept: FAMILY MEDICINE | Facility: CLINIC | Age: 57
End: 2022-11-15
Payer: MEDICAID

## 2022-11-15 VITALS
BODY MASS INDEX: 26.88 KG/M2 | HEART RATE: 98 BPM | OXYGEN SATURATION: 97 % | DIASTOLIC BLOOD PRESSURE: 82 MMHG | WEIGHT: 164 LBS | SYSTOLIC BLOOD PRESSURE: 136 MMHG | RESPIRATION RATE: 16 BRPM

## 2022-11-15 DIAGNOSIS — M54.42 CHRONIC BILATERAL LOW BACK PAIN WITH LEFT-SIDED SCIATICA: ICD-10-CM

## 2022-11-15 DIAGNOSIS — G89.29 CHRONIC BILATERAL LOW BACK PAIN WITH LEFT-SIDED SCIATICA: ICD-10-CM

## 2022-11-15 PROBLEM — F17.200 TOBACCO DEPENDENCE SYNDROME: Status: ACTIVE | Noted: 2022-11-15

## 2022-11-15 LAB
ANION GAP SERPL CALCULATED.3IONS-SCNC: 10 MMOL/L (ref 7–15)
BUN SERPL-MCNC: 11.3 MG/DL (ref 6–20)
CALCIUM SERPL-MCNC: 8.8 MG/DL (ref 8.6–10)
CHLORIDE SERPL-SCNC: 100 MMOL/L (ref 98–107)
CREAT SERPL-MCNC: 0.7 MG/DL (ref 0.67–1.17)
DEPRECATED HCO3 PLAS-SCNC: 28 MMOL/L (ref 22–29)
ERYTHROCYTE [DISTWIDTH] IN BLOOD BY AUTOMATED COUNT: 12.8 % (ref 10–15)
GFR SERPL CREATININE-BSD FRML MDRD: >90 ML/MIN/1.73M2
GLUCOSE SERPL-MCNC: 131 MG/DL (ref 70–99)
HCT VFR BLD AUTO: 52.8 % (ref 40–53)
HGB BLD-MCNC: 17.1 G/DL (ref 13.3–17.7)
MCH RBC QN AUTO: 29.1 PG (ref 26.5–33)
MCHC RBC AUTO-ENTMCNC: 32.4 G/DL (ref 31.5–36.5)
MCV RBC AUTO: 90 FL (ref 78–100)
PLATELET # BLD AUTO: 343 10E3/UL (ref 150–450)
POTASSIUM SERPL-SCNC: 4.9 MMOL/L (ref 3.4–5.3)
RBC # BLD AUTO: 5.87 10E6/UL (ref 4.4–5.9)
SODIUM SERPL-SCNC: 138 MMOL/L (ref 136–145)
WBC # BLD AUTO: 10.3 10E3/UL (ref 4–11)

## 2022-11-15 PROCEDURE — 80048 BASIC METABOLIC PNL TOTAL CA: CPT | Performed by: FAMILY MEDICINE

## 2022-11-15 PROCEDURE — 99213 OFFICE O/P EST LOW 20 MIN: CPT | Performed by: FAMILY MEDICINE

## 2022-11-15 PROCEDURE — 36415 COLL VENOUS BLD VENIPUNCTURE: CPT | Performed by: FAMILY MEDICINE

## 2022-11-15 PROCEDURE — 85027 COMPLETE CBC AUTOMATED: CPT | Performed by: FAMILY MEDICINE

## 2022-11-15 RX ORDER — OXYCODONE HYDROCHLORIDE 5 MG/1
5 TABLET ORAL EVERY 6 HOURS PRN
Qty: 15 TABLET | Refills: 0 | Status: SHIPPED | OUTPATIENT
Start: 2022-11-15 | End: 2024-05-07

## 2022-11-15 RX ORDER — BACLOFEN 10 MG/1
10 TABLET ORAL 3 TIMES DAILY PRN
Qty: 90 TABLET | Refills: 1 | Status: SHIPPED | OUTPATIENT
Start: 2022-11-15 | End: 2024-05-07

## 2022-11-15 RX ORDER — IBUPROFEN 800 MG/1
800 TABLET, FILM COATED ORAL EVERY 8 HOURS PRN
Qty: 90 TABLET | Refills: 11 | Status: SHIPPED | OUTPATIENT
Start: 2022-11-15 | End: 2023-12-19

## 2022-11-15 RX ORDER — BACLOFEN 10 MG/1
10 TABLET ORAL 3 TIMES DAILY PRN
COMMUNITY
Start: 2021-06-18 | End: 2022-11-15

## 2022-11-15 NOTE — LETTER
November 16, 2022      [address]ale [address]  [address]   [facility] [address]        Dear ,    We are writing to inform you of your test results.    Your test results fall within the expected range(s) or remain unchanged from previous results.  Please continue with current treatment plan.    Resulted Orders   CBC with platelets   Result Value Ref Range    WBC Count 10.3 4.0 - 11.0 10e3/uL    RBC Count 5.87 4.40 - 5.90 10e6/uL    Hemoglobin 17.1 13.3 - 17.7 g/dL    Hematocrit 52.8 40.0 - 53.0 %    MCV 90 78 - 100 fL    MCH 29.1 26.5 - 33.0 pg    MCHC 32.4 31.5 - 36.5 g/dL    RDW 12.8 10.0 - 15.0 %    Platelet Count 343 150 - 450 10e3/uL   Basic metabolic panel  (Ca, Cl, CO2, Creat, Gluc, K, Na, BUN)   Result Value Ref Range    Sodium 138 136 - 145 mmol/L    Potassium 4.9 3.4 - 5.3 mmol/L    Chloride 100 98 - 107 mmol/L    Carbon Dioxide (CO2) 28 22 - 29 mmol/L    Anion Gap 10 7 - 15 mmol/L    Urea Nitrogen 11.3 6.0 - 20.0 mg/dL    Creatinine 0.70 0.67 - 1.17 mg/dL    Calcium 8.8 8.6 - 10.0 mg/dL    Glucose 131 (H) 70 - 99 mg/dL    GFR Estimate >90 >60 mL/min/1.73m2      Comment:      Effective December 21, 2021 eGFRcr in adults is calculated using the 2021 CKD-EPI creatinine equation which includes age and gender ( et al., NEJM, DOI: 10.1056/REDVau9801650)       If you have any questions or concerns, please call the clinic at the number listed above.       Sincerely,      Jose Dela Cruz MD

## 2022-11-15 NOTE — PROGRESS NOTES
"  Assessment & Plan     Chronic bilateral low back pain with left-sided sciatica  Controlled by prescription medication prescribed by me and up to date.  - baclofen (LIORESAL) 10 MG tablet; Take 1 tablet (10 mg) by mouth 3 times daily as needed for muscle spasms  - ibuprofen (ADVIL/MOTRIN) 800 MG tablet; Take 1 tablet (800 mg) by mouth every 8 hours as needed  - oxyCODONE (ROXICODONE) 5 MG tablet; Take 1 tablet (5 mg) by mouth every 6 hours as needed for severe pain  - CBC with platelets; Future  - Basic metabolic panel  (Ca, Cl, CO2, Creat, Gluc, K, Na, BUN); Future           Nicotine/Tobacco Cessation:  He reports that he has been smoking cigarettes. He has never used smokeless tobacco.  Nicotine/Tobacco Cessation Plan:   Information offered: Patient not interested at this time      BMI:   Estimated body mass index is 26.88 kg/m  as calculated from the following:    Height as of 1/28/22: 1.664 m (5' 5.5\").    Weight as of this encounter: 74.4 kg (164 lb).           Return in about 6 months (around 5/15/2023).    Jose Dela Cruz MD  [facility]    Forest Hunter is a 57 year old, presenting for the following health issues:  Pain Management (Refills/)      History of Present Illness       Back Pain:  He presents for follow up of back pain. Patient's back pain is a recurring problem.  Location of back pain:  Right lower back, left lower back, right middle of back, left middle of back, right buttock, left buttock, right hip, left hip, right side of waist and left side of waist  Description of back pain: sharp and shooting  Back pain spreads: right buttocks, left buttocks, right thigh and left thigh    Since patient first noticed back pain, pain is: unchanged  Does back pain interfere with his job:  Yes      He eats 0-1 servings of fruits and vegetables daily.He consumes 2 sweetened beverage(s) daily.He exercises with enough effort to increase his heart rate 9 or less minutes per " day.  He exercises with enough effort to increase his heart rate 3 or less days per week.   He is taking medications regularly.   Review of Systems   Constitutional, HEENT, cardiovascular, pulmonary, gi and gu systems are negative, except as otherwise noted.      Objective    /82 (BP Location: Right arm, Patient Position: Sitting)   Pulse 98   Resp 16   Wt 74.4 kg (164 lb)   SpO2 97%   BMI 26.88 kg/m    Body mass index is 26.88 kg/m .  Physical Exam   GENERAL: healthy, alert and no distress  NECK: no adenopathy, no asymmetry, masses, or scars and thyroid normal to palpation  RESP: lungs clear to auscultation - no rales, rhonchi or wheezes  CV: regular rate and rhythm, normal S1 S2, no S3 or S4, no murmur, click or rub, no peripheral edema and peripheral pulses strong  MS: low back pain

## 2023-05-02 ENCOUNTER — OFFICE VISIT (OUTPATIENT)
Dept: FAMILY MEDICINE | Facility: CLINIC | Age: 58
End: 2023-05-02
Payer: MEDICAID

## 2023-05-02 VITALS
TEMPERATURE: 97.2 F | WEIGHT: 167 LBS | DIASTOLIC BLOOD PRESSURE: 78 MMHG | BODY MASS INDEX: 26.84 KG/M2 | SYSTOLIC BLOOD PRESSURE: 128 MMHG | OXYGEN SATURATION: 95 % | HEART RATE: 90 BPM | RESPIRATION RATE: 16 BRPM | HEIGHT: 66 IN

## 2023-05-02 DIAGNOSIS — M54.42 CHRONIC BILATERAL LOW BACK PAIN WITH LEFT-SIDED SCIATICA: ICD-10-CM

## 2023-05-02 DIAGNOSIS — F17.200 TOBACCO DEPENDENCE SYNDROME: ICD-10-CM

## 2023-05-02 DIAGNOSIS — Z12.11 SCREEN FOR COLON CANCER: ICD-10-CM

## 2023-05-02 DIAGNOSIS — T14.8XXA PUNCTURE WOUND: Primary | ICD-10-CM

## 2023-05-02 DIAGNOSIS — Z11.4 SCREENING FOR HIV (HUMAN IMMUNODEFICIENCY VIRUS): ICD-10-CM

## 2023-05-02 DIAGNOSIS — F11.90 CHRONIC, CONTINUOUS USE OF OPIOIDS: ICD-10-CM

## 2023-05-02 DIAGNOSIS — Z13.220 ENCOUNTER FOR LIPID SCREENING FOR CARDIOVASCULAR DISEASE: ICD-10-CM

## 2023-05-02 DIAGNOSIS — G89.29 CHRONIC BILATERAL LOW BACK PAIN WITH LEFT-SIDED SCIATICA: ICD-10-CM

## 2023-05-02 DIAGNOSIS — Z13.6 ENCOUNTER FOR LIPID SCREENING FOR CARDIOVASCULAR DISEASE: ICD-10-CM

## 2023-05-02 DIAGNOSIS — Z11.59 NEED FOR HEPATITIS C SCREENING TEST: ICD-10-CM

## 2023-05-02 PROCEDURE — 99214 OFFICE O/P EST MOD 30 MIN: CPT | Mod: 25 | Performed by: FAMILY MEDICINE

## 2023-05-02 PROCEDURE — 90471 IMMUNIZATION ADMIN: CPT | Mod: SL | Performed by: FAMILY MEDICINE

## 2023-05-02 PROCEDURE — 90715 TDAP VACCINE 7 YRS/> IM: CPT | Performed by: FAMILY MEDICINE

## 2023-05-02 RX ORDER — SULFAMETHOXAZOLE/TRIMETHOPRIM 800-160 MG
1 TABLET ORAL 2 TIMES DAILY
Qty: 20 TABLET | Refills: 0 | Status: SHIPPED | OUTPATIENT
Start: 2023-05-02 | End: 2023-12-19

## 2023-05-02 ASSESSMENT — PAIN SCALES - GENERAL: PAINLEVEL: NO PAIN (0)

## 2023-05-02 ASSESSMENT — ENCOUNTER SYMPTOMS: BACK PAIN: 1

## 2023-05-02 NOTE — PROGRESS NOTES
Assessment & Plan     Tobacco dependence syndrome  He is not interested in quitting at this time    Screen for colon cancer  He is willing to set this up  - Colonoscopy Screening  Referral; Future    Screening for HIV (human immunodeficiency virus)  He will get a future date  - HIV Antigen Antibody Combo; Future    Need for hepatitis C screening test  He will get it at a future date  - Hepatitis C Screen Reflex to HCV RNA Quant and Genotype; Future    Puncture wound  We will prophylactically treat him with antibiotics and get a tetanus shot  - sulfamethoxazole-trimethoprim (BACTRIM DS) 800-160 MG tablet; Take 1 tablet by mouth 2 times daily    Chronic, continuous use of opioids  He has elected not to refill his opioids as he does not wish to be labeled as a chronic opioid user    Chronic bilateral low back pain with left-sided sciatica  This is a chronic problem from a Workmen's Comp. injury    Encounter for lipid screening for cardiovascular disease  He will get this at a later date  - Lipid panel reflex to direct LDL Non-fasting; Future      30 minutes spent by me on the date of the encounter doing chart review, history and exam, documentation and further activities per the note           Jose Dela Cruz MD  [facility]    Forest Hunter is a 57 year old, presenting for the following health issues:  Puncture Wound (Stepped on a nail last night with right foot. Puncture wound. )        5/2/2023     2:23 PM   Additional Questions   Roomed by Justin   Accompanied by n/a     History of Present Illness       Back Pain:  He presents for follow up of back pain. Patient's back pain is a recurring problem.  Location of back pain:  Left lower back, right middle of back, left middle of back, left side of neck, left shoulder, right buttock, left buttock, right hip, left hip, right side of waist and left side of waist  Description of back pain: sharp and shooting  Back pain  "spreads: right buttocks, left buttocks, right thigh, left thigh, left shoulder and left side of neck    Since patient first noticed back pain, pain is: gradually worsening  Does back pain interfere with his job:  Yes      Reason for visit:  Stepped on a nail- right foot  Symptom onset:  1-3 days ago    He eats 2-3 servings of fruits and vegetables daily.He consumes 2 sweetened beverage(s) daily.He exercises with enough effort to increase his heart rate 10 to 19 minutes per day.  He exercises with enough effort to increase his heart rate 7 days per week.   He is taking medications regularly.       Chronic/Recurring Back Pain Follow Up      Where is your back pain located? (Select all that apply) low back     How would you describe your back pain?  cramping and stabbing    Where does your back pain spread?  Variable    Since your last clinic visit for back pain, how has your pain changed?  It waxes and wanes    Does your back pain interfere with your job?  Yes from time to time    Since your last visit, have you tried any new treatment? No          Review of Systems   Musculoskeletal: Positive for back pain.            Objective    BP (!) 146/72 (BP Location: Right arm, Patient Position: Sitting)   Pulse 90   Temp 97.2  F (36.2  C) (Tympanic)   Resp 16   Ht 1.664 m (5' 5.5\")   Wt 75.8 kg (167 lb)   SpO2 95%   BMI 27.37 kg/m    Body mass index is 27.37 kg/m .  Physical Exam   GENERAL: healthy, alert and no distress  NECK: no adenopathy, no asymmetry, masses, or scars and thyroid normal to palpation  RESP: lungs clear to auscultation - no rales, rhonchi or wheezes  CV: regular rate and rhythm, normal S1 S2, no S3 or S4, no murmur, click or rub, no peripheral edema and peripheral pulses strong  ABDOMEN: soft, nontender, no hepatosplenomegaly, no masses and bowel sounds normal  MS: no gross musculoskeletal defects noted, no edema                    "

## 2023-05-02 NOTE — LETTER
Opioid / Opioid Plus Controlled Substance Agreement    This is an agreement between you and your provider about the safe and appropriate use of controlled substance/opioids prescribed by your care team. Controlled substances are medicines that can cause physical and mental dependence (abuse).    There are strict laws about having and using these medicines. We here at [facility] are committing to working with you in your efforts to get better. To support you in this work, we ll help you schedule regular office appointments for medicine refills. If we must cancel or change your appointment for any reason, we ll make sure you have enough medicine to last until your next appointment.     As a Provider, I will:  Listen carefully to your concerns and treat you with respect.   Recommend a treatment plan that I believe is in your best interest. This plan may involve therapies other than opioid pain medication.   Talk with you often about the possible benefits, and the risk of harm of any medicine that we prescribe for you.   Provide a plan on how to taper (discontinue or go off) using this medicine if the decision is made to stop its use.    As a Patient, I understand that opioid(s):   Are a controlled substance prescribed by my care team to help me function or work and manage my condition(s).   Are strong medicines and can cause serious side effects such as:  Drowsiness, which can seriously affect my driving ability  A lower breathing rate, enough to cause death  Harm to my thinking ability   Depression   Abuse of and addiction to this medicine  Need to be taken exactly as prescribed. Combining opioids with certain medicines or chemicals (such as illegal drugs, sedatives, sleeping pills, and benzodiazepines) can be dangerous or even fatal. If I stop opioids suddenly, I may have severe withdrawal symptoms.  Do not work for all types of pain nor for all patients. If they re not helpful, I may be asked to stop  them.        The risks, benefits and side effects of these medicine(s) were explained to me. I agree that:  I will take part in other treatments as advised by my care team. This may be psychiatry or counseling, physical therapy, behavioral therapy, group treatment or a referral to a specialist.     I will keep all my appointments. I understand that this is part of the monitoring of opioids. My care team may require an office visit for EVERY opioid/controlled substance refill. If I miss appointments or don t follow instructions, my care team may stop my medicine.    I will take my medicines as prescribed. I will not change the dose or schedule unless my care team tells me to. There will be no refills if I run out early.     I may be asked to come to the clinic and complete a urine drug test or complete a pill count at any time. If I don t give a urine sample or participate in a pill count, the care team may stop my medicine.    I will only receive prescriptions from this clinic for chronic pain. If I am treated by another provider for acute pain issues, I will tell them that I am taking opioid pain medication for chronic pain and that I have a treatment agreement with this provider. I will inform my [facility] care team within one business day if I am given a prescription for any pain medication by another healthcare provider. My [facility] care team can contact other providers and pharmacists about my use of any medicines.    It is up to me to make sure that I don t run out of my medicines on weekends or holidays. If my care team is willing to refill my opioid prescription without a visit, I must request refills only during office hours. Refills may take up to 3 business days to process. I will use one pharmacy to fill all my opioid and other controlled substance prescriptions. I will notify the clinic about any changes to my insurance or medication availability.    I am responsible for my  prescriptions. If the medicine/prescription is lost, stolen or destroyed, it will not be replaced. I also agree not to share controlled substance medicines with anyone.    I am aware I should not use any illegal or recreational drugs. I agree not to drink alcohol unless my care team says I can.       If I enroll in the [place] [facility] program, I will tell my care team prior to my next refill.     I will tell my care team right away if I become pregnant, have a new medical problem treated outside of my regular clinic, or have a change in my medications.    I understand that this medicine can affect my thinking, judgment and reaction time. Alcohol and drugs affect the brain and body, which can affect the safety of my driving. Being under the influence of alcohol or drugs can affect my decision-making, behaviors, personal safety, and the safety of others. Driving while impaired (DWI) can occur if a person is driving, operating, or in physical control of a car, motorcycle, boat, snowmobile, ATV, motorbike, off-road vehicle, or any other motor vehicle ([place] Statute 169A.20). I understand the risk if I choose to drive or operate any vehicle or machinery.    I understand that if I do not follow any of the conditions above, my prescriptions or treatment may be stopped or changed.          Opioids  What You Need to Know    What are opioids?   Opioids are pain medicines that must be prescribed by a doctor. They are also known as narcotics.     Examples are:   morphine (MS Contin, Seema)  oxycodone (Oxycontin)  oxycodone and acetaminophen (Percocet)  hydrocodone and acetaminophen (Vicodin, Norco)   fentanyl patch (Duragesic)   hydromorphone (Dilaudid)   methadone  codeine (Tylenol #3)     What do opioids do well?   Opioids are best for severe short-term pain such as after a surgery or injury. They may work well for cancer pain. They may help some people with long-lasting (chronic) pain.     What do opioids NOT do  well?   Opioids never get rid of pain entirely, and they don t work well for most patients with chronic pain. Opioids don t reduce swelling, one of the causes of pain.                                    Other ways to manage chronic pain and improve function include:     Treat the health problem that may be causing pain  Anti-inflammation medicines, which reduce swelling and tenderness, such as ibuprofen (Advil, Motrin) or naproxen (Aleve)  Acetaminophen (Tylenol)  Antidepressants and anti-seizure medicines, especially for nerve pain  Topical treatments such as patches or creams  Injections or nerve blocks  Chiropractic or osteopathic treatment  Acupuncture, massage, deep breathing, meditation, visual imagery, aromatherapy  Use heat or ice at the pain site  Physical therapy   Exercise  Stop smoking  Take part in therapy       Risks and side effects     Talk to your doctor before you start or decide to keep taking opioids. Possible side effects include:    Lowering your breathing rate enough to cause death  Overdose, including death, especially if taking higher than prescribed doses  Worse depression symptoms; less pleasure in things you usually enjoy  Feeling tired or sluggish  Slower thoughts or cloudy thinking  Being more sensitive to pain over time; pain is harder to control  Trouble sleeping or restless sleep  Changes in hormone levels (for example, less testosterone)  Changes in sex drive or ability to have sex  Constipation  Unsafe driving  Itching and sweating  Dizziness  Nausea, throwing up and dry mouth    What else should I know about opioids?    Opioids may lead to dependence, tolerance, or addiction.    Dependence means that if you stop or reduce the medicine too quickly, you will have withdrawal symptoms. These include loose poop (diarrhea), jitters, flu-like symptoms, nervousness and tremors. Dependence is not the same as addiction.                     Tolerance means needing higher doses over time to  get the same effect. This may increase the chance of serious side effects.    Addiction is when people improperly use a substance that harms their body, their mind or their relations with others. Use of opiates can cause a relapse of addiction if you have a history of drug or alcohol abuse.    People who have used opioids for a long time may have a lower quality of life, worse depression, higher levels of pain and more visits to doctors.    You can overdose on opioids. Take these steps to lower your risk of overdose:    Recognize the signs:  Signs of overdose include decrease or loss of consciousness (blackout), slowed breathing, trouble waking up and blue lips. If someone is worried about overdose, they should call 911.    Talk to your doctor about Narcan (naloxone).   If you are at risk for overdose, you may be given a prescription for Narcan. This medicine very quickly reverses the effects of opioids.   If you overdose, a friend or family member can give you Narcan while waiting for the ambulance. They need to know the signs of overdose and how to give Narcan.     Don't use alcohol or street drugs.   Taking them with opioids can cause death.    Do not take any of these medicines unless your doctor says it s OK. Taking these with opioids can cause death:  Benzodiazepines, such as lorazepam (Ativan), alprazolam (Xanax) or diazepam (Valium)  Muscle relaxers, such as cyclobenzaprine (Flexeril)  Sleeping pills like zolpidem (Ambien)   Other opioids      How to keep you and other people safe while taking opioids:    Never share your opioids with others.  Opioid medicines are regulated by the Drug Enforcement Agency (JEFF[place]). Selling or sharing medications is a criminal act.    2. Be sure to store opioids in a secure place, locked up if possible. Young children can easily swallow them and overdose.    3. When you are traveling with your medicines, keep them in the original bottles. If you use a pill box, be sure you also  carry a copy of your medicine list from your clinic or pharmacy.    4. Safe disposal of opioids    Most pharmacies have places to get rid of medicine, called disposal kiosks. Medicine disposal options are also available in every [facility]. Search your county and  medication disposal  to find more options. You can find more details at:  [email or web address]://[email or web address]     I agree that my provider, clinic care team, and pharmacy may work with any city, state or federal law enforcement agency that investigates the misuse, sale, or other diversion of my controlled medicine. I will allow my provider to discuss my care with, or share a copy of, this agreement with any other treating provider, pharmacy or emergency room where I receive care.    I have read this agreement and have asked questions about anything I did not understand.    _______________________________________________________  Patient Signature - Dale Hawkins _____________________                   Date     _______________________________________________________  Provider Signature - Jose Dela Cruz MD   _____________________                   Date     _______________________________________________________  Witness Signature (required if provider not present while patient signing)   _____________________                   Date

## 2023-12-19 ENCOUNTER — OFFICE VISIT (OUTPATIENT)
Dept: FAMILY MEDICINE | Facility: CLINIC | Age: 58
End: 2023-12-19
Payer: MEDICAID

## 2023-12-19 VITALS
SYSTOLIC BLOOD PRESSURE: 138 MMHG | HEART RATE: 92 BPM | WEIGHT: 162 LBS | DIASTOLIC BLOOD PRESSURE: 82 MMHG | RESPIRATION RATE: 16 BRPM | HEIGHT: 66 IN | BODY MASS INDEX: 26.03 KG/M2 | TEMPERATURE: 98 F | OXYGEN SATURATION: 98 %

## 2023-12-19 DIAGNOSIS — Z11.4 SCREENING FOR HIV (HUMAN IMMUNODEFICIENCY VIRUS): ICD-10-CM

## 2023-12-19 DIAGNOSIS — F11.90 CHRONIC, CONTINUOUS USE OF OPIOIDS: ICD-10-CM

## 2023-12-19 DIAGNOSIS — Z13.6 ENCOUNTER FOR LIPID SCREENING FOR CARDIOVASCULAR DISEASE: ICD-10-CM

## 2023-12-19 DIAGNOSIS — G89.29 CHRONIC BILATERAL LOW BACK PAIN WITH LEFT-SIDED SCIATICA: ICD-10-CM

## 2023-12-19 DIAGNOSIS — Z13.220 ENCOUNTER FOR LIPID SCREENING FOR CARDIOVASCULAR DISEASE: ICD-10-CM

## 2023-12-19 DIAGNOSIS — Z11.59 NEED FOR HEPATITIS C SCREENING TEST: ICD-10-CM

## 2023-12-19 DIAGNOSIS — K04.7 DENTAL ABSCESS: ICD-10-CM

## 2023-12-19 DIAGNOSIS — M54.42 CHRONIC BILATERAL LOW BACK PAIN WITH LEFT-SIDED SCIATICA: ICD-10-CM

## 2023-12-19 DIAGNOSIS — F17.200 TOBACCO DEPENDENCE SYNDROME: Primary | ICD-10-CM

## 2023-12-19 LAB
ANION GAP SERPL CALCULATED.3IONS-SCNC: 11 MMOL/L (ref 7–15)
BUN SERPL-MCNC: 13.4 MG/DL (ref 6–20)
CALCIUM SERPL-MCNC: 9.3 MG/DL (ref 8.6–10)
CHLORIDE SERPL-SCNC: 102 MMOL/L (ref 98–107)
CHOLEST SERPL-MCNC: 163 MG/DL
CREAT SERPL-MCNC: 0.86 MG/DL (ref 0.67–1.17)
DEPRECATED HCO3 PLAS-SCNC: 26 MMOL/L (ref 22–29)
EGFRCR SERPLBLD CKD-EPI 2021: >90 ML/MIN/1.73M2
ERYTHROCYTE [DISTWIDTH] IN BLOOD BY AUTOMATED COUNT: 12.6 % (ref 10–15)
FASTING STATUS PATIENT QL REPORTED: NO
GLUCOSE SERPL-MCNC: 106 MG/DL (ref 70–99)
HCT VFR BLD AUTO: 46.8 % (ref 40–53)
HDLC SERPL-MCNC: 61 MG/DL
HGB BLD-MCNC: 15.8 G/DL (ref 13.3–17.7)
LDLC SERPL CALC-MCNC: 93 MG/DL
MCH RBC QN AUTO: 29.8 PG (ref 26.5–33)
MCHC RBC AUTO-ENTMCNC: 33.8 G/DL (ref 31.5–36.5)
MCV RBC AUTO: 88 FL (ref 78–100)
NONHDLC SERPL-MCNC: 102 MG/DL
PLATELET # BLD AUTO: 344 10E3/UL (ref 150–450)
POTASSIUM SERPL-SCNC: 3.9 MMOL/L (ref 3.4–5.3)
RBC # BLD AUTO: 5.31 10E6/UL (ref 4.4–5.9)
SODIUM SERPL-SCNC: 139 MMOL/L (ref 135–145)
TRIGL SERPL-MCNC: 45 MG/DL
WBC # BLD AUTO: 13.2 10E3/UL (ref 4–11)

## 2023-12-19 PROCEDURE — 87389 HIV-1 AG W/HIV-1&-2 AB AG IA: CPT | Performed by: FAMILY MEDICINE

## 2023-12-19 PROCEDURE — 80048 BASIC METABOLIC PNL TOTAL CA: CPT | Performed by: FAMILY MEDICINE

## 2023-12-19 PROCEDURE — 99214 OFFICE O/P EST MOD 30 MIN: CPT | Performed by: FAMILY MEDICINE

## 2023-12-19 PROCEDURE — 80061 LIPID PANEL: CPT | Performed by: FAMILY MEDICINE

## 2023-12-19 PROCEDURE — 85027 COMPLETE CBC AUTOMATED: CPT | Performed by: FAMILY MEDICINE

## 2023-12-19 PROCEDURE — 36415 COLL VENOUS BLD VENIPUNCTURE: CPT | Performed by: FAMILY MEDICINE

## 2023-12-19 PROCEDURE — 86803 HEPATITIS C AB TEST: CPT | Performed by: FAMILY MEDICINE

## 2023-12-19 RX ORDER — CEPHALEXIN 500 MG/1
500 CAPSULE ORAL 3 TIMES DAILY
Qty: 30 CAPSULE | Refills: 0 | Status: SHIPPED | OUTPATIENT
Start: 2023-12-19 | End: 2024-05-07

## 2023-12-19 RX ORDER — IBUPROFEN 800 MG/1
800 TABLET, FILM COATED ORAL EVERY 8 HOURS PRN
Qty: 90 TABLET | Refills: 11 | Status: SHIPPED | OUTPATIENT
Start: 2023-12-19

## 2023-12-19 ASSESSMENT — ANXIETY QUESTIONNAIRES
2. NOT BEING ABLE TO STOP OR CONTROL WORRYING: NOT AT ALL
6. BECOMING EASILY ANNOYED OR IRRITABLE: SEVERAL DAYS
4. TROUBLE RELAXING: NOT AT ALL
GAD7 TOTAL SCORE: 1
IF YOU CHECKED OFF ANY PROBLEMS ON THIS QUESTIONNAIRE, HOW DIFFICULT HAVE THESE PROBLEMS MADE IT FOR YOU TO DO YOUR WORK, TAKE CARE OF THINGS AT HOME, OR GET ALONG WITH OTHER PEOPLE: NOT DIFFICULT AT ALL
3. WORRYING TOO MUCH ABOUT DIFFERENT THINGS: NOT AT ALL
5. BEING SO RESTLESS THAT IT IS HARD TO SIT STILL: NOT AT ALL
7. FEELING AFRAID AS IF SOMETHING AWFUL MIGHT HAPPEN: NOT AT ALL
GAD7 TOTAL SCORE: 1
1. FEELING NERVOUS, ANXIOUS, OR ON EDGE: NOT AT ALL

## 2023-12-19 ASSESSMENT — PATIENT HEALTH QUESTIONNAIRE - PHQ9
SUM OF ALL RESPONSES TO PHQ QUESTIONS 1-9: 7
SUM OF ALL RESPONSES TO PHQ QUESTIONS 1-9: 7
10. IF YOU CHECKED OFF ANY PROBLEMS, HOW DIFFICULT HAVE THESE PROBLEMS MADE IT FOR YOU TO DO YOUR WORK, TAKE CARE OF THINGS AT HOME, OR GET ALONG WITH OTHER PEOPLE: NOT DIFFICULT AT ALL

## 2023-12-19 NOTE — PROGRESS NOTES
"  Assessment & Plan     Tobacco dependence syndrome  Advised to quit, not ready.    Chronic, continuous use of opioids  Declined, has not had any narcotics for over a year. PDMP    Chronic bilateral low back pain with left-sided sciatica  Continuous  - ibuprofen (ADVIL/MOTRIN) 800 MG tablet; Take 1 tablet (800 mg) by mouth every 8 hours as needed  - Basic metabolic panel  (Ca, Cl, CO2, Creat, Gluc, K, Na, BUN); Future  - CBC with platelets; Future    Dental abscess  Will need to see dentist  - cephALEXin (KEFLEX) 500 MG capsule; Take 1 capsule (500 mg) by mouth 3 times daily             Nicotine/Tobacco Cessation:  He reports that he has been smoking cigarettes. He has been exposed to tobacco smoke. He has never used smokeless tobacco.  Nicotine/Tobacco Cessation Plan:   Information offered: Patient not interested at this time      BMI:   Estimated body mass index is 26.55 kg/m  as calculated from the following:    Height as of this encounter: 1.664 m (5' 5.5\").    Weight as of this encounter: 73.5 kg (162 lb).           Jose Dela Cruz MD  [facility]    Forest Hunter is a 58 year old, presenting for the following health issues:  Tailbone Pain (Has been seen for in past. ) and lower R tooth infection (Needs to call dentist)      12/19/2023     2:54 PM   Additional Questions   Roomed by SHANE MALDONADO       As per CC.     Chronic/Recurring Back Pain Follow Up    Where is your back pain located? (Select all that apply) low back left  How would you describe your back pain?  gnawing, sharp, and shooting  Where does your back pain spread? the left  knee  Since your last clinic visit for back pain, how has your pain changed? unchanged  Does your back pain interfere with your job? Not applicable  Since your last visit, have you tried any new treatment? No    Acute Illness  Acute illness concerns: Dental Pain  Onset/Duration: Yesterday  Symptoms:  Fever: No  Chills/Sweats: No  Headache " "(location?): No  Sinus Pressure: YES  Conjunctivitis:  No  Ear Pain: no    Therapies tried and outcome: None      Review of Systems   Otherwise negative      Objective    BP (!) 156/84 (BP Location: Right arm, Patient Position: Sitting, Cuff Size: Adult Large)   Pulse 92   Temp 98  F (36.7  C) (Tympanic)   Resp 16   Ht 1.664 m (5' 5.5\")   Wt 73.5 kg (162 lb)   SpO2 98%   BMI 26.55 kg/m    Body mass index is 26.55 kg/m .  Physical Exam   GENERAL: healthy, alert and no distress  HENT: normal cephalic/atraumatic, ear canals and TM's normal, oral mucous membranes moist, and Right cheek swollen  MS: Low back pain                    Answers submitted by the patient for this visit:  Patient Health Questionnaire (Submitted on 12/19/2023)  If you checked off any problems, how difficult have these problems made it for you to do your work, take care of things at home, or get along with other people?: Not difficult at all  PHQ9 TOTAL SCORE: 7  ROCAEL-7 (Submitted on 12/19/2023)  ROCAEL 7 TOTAL SCORE: 1    "

## 2023-12-20 LAB
HCV AB SERPL QL IA: NONREACTIVE
HIV 1+2 AB+HIV1 P24 AG SERPL QL IA: NONREACTIVE

## 2024-05-07 ENCOUNTER — NURSE TRIAGE (OUTPATIENT)
Dept: FAMILY MEDICINE | Facility: CLINIC | Age: 59
End: 2024-05-07

## 2024-05-07 ENCOUNTER — OFFICE VISIT (OUTPATIENT)
Dept: FAMILY MEDICINE | Facility: CLINIC | Age: 59
End: 2024-05-07
Payer: MEDICAID

## 2024-05-07 VITALS
HEART RATE: 96 BPM | WEIGHT: 167.5 LBS | SYSTOLIC BLOOD PRESSURE: 122 MMHG | DIASTOLIC BLOOD PRESSURE: 78 MMHG | BODY MASS INDEX: 27.91 KG/M2 | OXYGEN SATURATION: 98 % | TEMPERATURE: 97.7 F | HEIGHT: 65 IN | RESPIRATION RATE: 16 BRPM
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
## 2024-05-07 DIAGNOSIS — S31.809A WOUND OF BUTTOCK, UNSPECIFIED LATERALITY, INITIAL ENCOUNTER: Primary | ICD-10-CM

## 2024-05-07 LAB
B BURGDOR IGG+IGM SER QL: 0.21
BASOPHILS # BLD AUTO: 0 10E3/UL (ref 0–0.2)
BASOPHILS NFR BLD AUTO: 0 %
EOSINOPHIL # BLD AUTO: 0.3 10E3/UL (ref 0–0.7)
EOSINOPHIL NFR BLD AUTO: 3 %
ERYTHROCYTE [DISTWIDTH] IN BLOOD BY AUTOMATED COUNT: 12.9 % (ref 10–15)
HCT VFR BLD AUTO: 49.3 % (ref 40–53)
HGB BLD-MCNC: 16.1 G/DL (ref 13.3–17.7)
IMM GRANULOCYTES # BLD: 0 10E3/UL
IMM GRANULOCYTES NFR BLD: 0 %
LYMPHOCYTES # BLD AUTO: 1.8 10E3/UL (ref 0.8–5.3)
LYMPHOCYTES NFR BLD AUTO: 19 %
MCH RBC QN AUTO: 28.8 PG (ref 26.5–33)
MCHC RBC AUTO-ENTMCNC: 32.7 G/DL (ref 31.5–36.5)
MCV RBC AUTO: 88 FL (ref 78–100)
MONOCYTES # BLD AUTO: 1 10E3/UL (ref 0–1.3)
MONOCYTES NFR BLD AUTO: 10 %
NEUTROPHILS # BLD AUTO: 6.3 10E3/UL (ref 1.6–8.3)
NEUTROPHILS NFR BLD AUTO: 67 %
PLATELET # BLD AUTO: 296 10E3/UL (ref 150–450)
RBC # BLD AUTO: 5.6 10E6/UL (ref 4.4–5.9)
WBC # BLD AUTO: 9.4 10E3/UL (ref 4–11)

## 2024-05-07 PROCEDURE — 36415 COLL VENOUS BLD VENIPUNCTURE: CPT | Performed by: NURSE PRACTITIONER

## 2024-05-07 PROCEDURE — 86618 LYME DISEASE ANTIBODY: CPT | Performed by: NURSE PRACTITIONER

## 2024-05-07 PROCEDURE — 85025 COMPLETE CBC W/AUTO DIFF WBC: CPT | Mod: QW | Performed by: NURSE PRACTITIONER

## 2024-05-07 PROCEDURE — 86140 C-REACTIVE PROTEIN: CPT | Performed by: NURSE PRACTITIONER

## 2024-05-07 PROCEDURE — 80053 COMPREHEN METABOLIC PANEL: CPT | Performed by: NURSE PRACTITIONER

## 2024-05-07 PROCEDURE — 99213 OFFICE O/P EST LOW 20 MIN: CPT | Performed by: NURSE PRACTITIONER

## 2024-05-07 RX ORDER — DOXYCYCLINE 100 MG/1
100 CAPSULE ORAL 2 TIMES DAILY
Qty: 14 CAPSULE | Refills: 0 | Status: SHIPPED | OUTPATIENT
Start: 2024-05-07 | End: 2024-05-14

## 2024-05-07 NOTE — PROGRESS NOTES
Assessment & Plan     Wound of buttock, unspecified laterality, initial encounter  Has progressing wound of gluteal cleft and area of recent tick bite but not consistent with erythema migrans.  Also has history of tailbone injury 2 weeks ago, fell onto pavement riding an electric skateboard.  Wound seems most consistent with traumatic hematoma with infection, pilonidal cyst also on differential.  Recommend starting doxycycline twice daily for 7 days.  This will cover for Lyme's disease if present.  Fortunately Lyme disease testing is negative.  CBC within normal limits without any leukocytosis.  Metabolic panel within normal limits, including liver tests.  CRP is elevated and I recommend repeat CRP at the end of this week or early next week.  - doxycycline hyclate (VIBRAMYCIN) 100 MG capsule; Take 1 capsule (100 mg) by mouth 2 times daily for 7 days  - CBC with Platelets & Differential  - CRP, inflammation  - Comprehensive metabolic panel    Tick bite, unspecified site, initial encounter  History of tick bite 3 weeks ago and area of current wound.  Lyme's disease testing negative.  He is being treated with doxycycline for wound infection.  - CBC with Platelets & Differential  - CRP, inflammation  - Comprehensive metabolic panel  - Lyme Disease Total Abs Bld with Reflex to Confirm CLIA                  Subjective   Dale is a 58 year old, presenting for the following health issues:  Insect Bites (Tick bite infected x 3 weeks ago just suddenly got infected)        5/7/2024     2:50 PM   Additional Questions   Roomed by SUZETTE Gottlieb   Accompanied by Carito - friend     Dale is a pleasant 58-year-old patient who is accompanied by his partner, Carito, who presents today for evaluation of tick bite on the gluteal cleft onset 3 weeks ago.  They noticed a sore on the upper buttocks 5 days ago, did not seem too bad the time but has progressed, now has pain, erythema and swelling, no discharge.  No fever or chills.  No  "abdominal pain, nausea or vomiting.  He has some mild bodyaches and headaches.  Has had headache for the last 2 days.  Carito thinks he has been \"more puffy\" recently although today denies any swelling of the face or extremities.  Taking ibuprofen for his symptoms.  He does walk in the woods, has opportunity for tick bites and did find a tick 3 weeks ago in that area which was removed.  No history of Lyme's disease.  Did inquire about any history of pilonidal cyst or injury.  States he did fall hard onto his tailbone 2 weeks ago riding an electric skateboard for the first time.  Fell onto the blacktop.  Pain has been significant enough that it is difficult to sit down, more comfortable to lay on his stomach.                       Review of Systems  Constitutional, HEENT, cardiovascular, pulmonary, gi and gu systems are negative, except as otherwise noted. See hpi for skin ros.      Objective    /78 (BP Location: Right arm, Patient Position: Sitting, Cuff Size: Adult Regular)   Pulse 96   Temp 97.7  F (36.5  C) (Tympanic)   Resp 16   Ht 1.651 m (5' 5\")   Wt 76 kg (167 lb 8 oz)   SpO2 98%   BMI 27.87 kg/m    Body mass index is 27.87 kg/m .  Physical Exam  Constitutional:       General: He is in acute distress (in pain).      Appearance: He is not ill-appearing.   HENT:      Head: Normocephalic and atraumatic.   Cardiovascular:      Rate and Rhythm: Normal rate and regular rhythm.   Pulmonary:      Effort: Pulmonary effort is normal.      Breath sounds: Normal breath sounds.   Skin:     Findings: Bruising, erythema and lesion present.             Comments: Superior gluteal cleft wound, with erythema, swelling and central area of ulceration.  See pictures.   Neurological:      Mental Status: He is alert and oriented to person, place, and time.   Psychiatric:         Mood and Affect: Mood normal.         Behavior: Behavior normal.                  Results for orders placed or performed in visit on 05/07/24 "   CRP, inflammation     Status: Abnormal   Result Value Ref Range    CRP Inflammation 108.00 (H) <5.00 mg/L   Comprehensive metabolic panel     Status: Abnormal   Result Value Ref Range    Sodium 136 135 - 145 mmol/L    Potassium 5.0 3.4 - 5.3 mmol/L    Carbon Dioxide (CO2) 28 22 - 29 mmol/L    Anion Gap 9 7 - 15 mmol/L    Urea Nitrogen 10.0 6.0 - 20.0 mg/dL    Creatinine 0.81 0.67 - 1.17 mg/dL    GFR Estimate >90 >60 mL/min/1.73m2    Calcium 9.5 8.6 - 10.0 mg/dL    Chloride 99 98 - 107 mmol/L    Glucose 108 (H) 70 - 99 mg/dL    Alkaline Phosphatase 93 40 - 150 U/L    AST 19 0 - 45 U/L    ALT 19 0 - 70 U/L    Protein Total 7.5 6.4 - 8.3 g/dL    Albumin 4.4 3.5 - 5.2 g/dL    Bilirubin Total 0.5 <=1.2 mg/dL   Lyme Disease Total Abs Bld with Reflex to Confirm CLIA     Status: Normal   Result Value Ref Range    Lyme Disease Antibodies Total 0.21 <0.90   CBC with platelets and differential     Status: None   Result Value Ref Range    WBC Count 9.4 4.0 - 11.0 10e3/uL    RBC Count 5.60 4.40 - 5.90 10e6/uL    Hemoglobin 16.1 13.3 - 17.7 g/dL    Hematocrit 49.3 40.0 - 53.0 %    MCV 88 78 - 100 fL    MCH 28.8 26.5 - 33.0 pg    MCHC 32.7 31.5 - 36.5 g/dL    RDW 12.9 10.0 - 15.0 %    Platelet Count 296 150 - 450 10e3/uL    % Neutrophils 67 %    % Lymphocytes 19 %    % Monocytes 10 %    % Eosinophils 3 %    % Basophils 0 %    % Immature Granulocytes 0 %    Absolute Neutrophils 6.3 1.6 - 8.3 10e3/uL    Absolute Lymphocytes 1.8 0.8 - 5.3 10e3/uL    Absolute Monocytes 1.0 0.0 - 1.3 10e3/uL    Absolute Eosinophils 0.3 0.0 - 0.7 10e3/uL    Absolute Basophils 0.0 0.0 - 0.2 10e3/uL    Absolute Immature Granulocytes 0.0 <=0.4 10e3/uL   CBC with Platelets & Differential     Status: None    Narrative    The following orders were created for panel order CBC with Platelets & Differential.  Procedure                               Abnormality         Status                     ---------                               -----------         ------                      CBC with platelets and d...[[id number]]                      Final result                 Please view results for these tests on the individual orders.           Signed Electronically by: GABRIELA Briscoe CNP

## 2024-05-07 NOTE — TELEPHONE ENCOUNTER
"S-(situation): Infected wound after tick removal.    B-(background): About 3 weeks ago, patient noticed tick embedded in the skin. Located right above tailbone.    A-(assessment): Large wound, the size of a grapefruit noticed about 5-6 days ago. Has the bullseye appearance. Significant other had difficult time explaining what the area looks like. Middle of the wound has a small sore about the size of a dime \"where the wound started, after the tick was removed, and looks very very bad\". Patient unsure if he has had fever, but does feel lethargic, stiff neck, joints and muscles. Wound is VERY tender.     R-(recommendations): RN advised appointment today. Patient prefers Dr. Dela Cruz- RN let him know he is o/c. Agreeable to seeing another provider. RN assisted in making appointment with Deepali Schumacher today, at 2:00 pm.      Routing triage notes to provider as FYI.     Reason for Disposition   Red ring or bull's-eye rash occurs around a deer tick bite    Additional Information   Negative: Not a tick bite   Negative: Patient sounds very sick or weak to the triager   Negative: Fever or severe headache occurs, 2 to 14 days following the bite   Negative: Widespread rash occurs, 2 to 14 days following the bite   Negative: Can't remove live tick (after using Care Advice)   Negative: Fever and spreading red area or streak   Negative: Fever and area is very tender to touch   Negative: Red streak or red line and length > 2 inches (5 cm)   Negative: Red or very tender (to touch) area and started over 24 hours after the bite    Protocols used: Tick Bite-A-OH    "

## 2024-05-08 ENCOUNTER — TELEPHONE (OUTPATIENT)
Dept: FAMILY MEDICINE | Facility: CLINIC | Age: 59
End: 2024-05-08
Payer: MEDICAID

## 2024-05-08 LAB
ALBUMIN SERPL BCG-MCNC: 4.4 G/DL (ref 3.5–5.2)
ALP SERPL-CCNC: 93 U/L (ref 40–150)
ALT SERPL W P-5'-P-CCNC: 19 U/L (ref 0–70)
ANION GAP SERPL CALCULATED.3IONS-SCNC: 9 MMOL/L (ref 7–15)
AST SERPL W P-5'-P-CCNC: 19 U/L (ref 0–45)
BILIRUB SERPL-MCNC: 0.5 MG/DL
BUN SERPL-MCNC: 10 MG/DL (ref 6–20)
CALCIUM SERPL-MCNC: 9.5 MG/DL (ref 8.6–10)
CHLORIDE SERPL-SCNC: 99 MMOL/L (ref 98–107)
CREAT SERPL-MCNC: 0.81 MG/DL (ref 0.67–1.17)
CRP SERPL-MCNC: 108 MG/L
DEPRECATED HCO3 PLAS-SCNC: 28 MMOL/L (ref 22–29)
EGFRCR SERPLBLD CKD-EPI 2021: >90 ML/MIN/1.73M2
GLUCOSE SERPL-MCNC: 108 MG/DL (ref 70–99)
POTASSIUM SERPL-SCNC: 5 MMOL/L (ref 3.4–5.3)
PROT SERPL-MCNC: 7.5 G/DL (ref 6.4–8.3)
SODIUM SERPL-SCNC: 136 MMOL/L (ref 135–145)

## 2024-05-08 NOTE — TELEPHONE ENCOUNTER
Attempted to call x2; message received 'calls cannot be received at this time'.   ----- Message from GABRIELA Kennedy CNP sent at 5/8/2024 12:21 PM CDT -----  Team - please call patient with results.    Complete blood count shows normal white blood cell count. Lyme disease test is negative.   CRP, marker of inflammation, is elevated.  I would like to monitor this with repeat CRP at the end of the week or early next week.  He should continue on antibiotic.  Is he feeling any better?   Thank you,   GABRIELA Briscoe CNP on 5/8/2024 at 12:21 PM

## 2024-05-09 NOTE — TELEPHONE ENCOUNTER
S-(situation):   Pt wanting more information on inflammatory marker lab.    B-(background):   CRP Inflammation   Date Value Ref Range Status   05/07/2024 108.00 (H) <5.00 mg/L Final      Lab results message relayed to pt:  Complete blood count shows normal white blood cell count. Lyme disease test is negative.   CRP, marker of inflammation, is elevated.  I would like to monitor this with repeat CRP at the end of the week or early next week.  He should continue on antibiotic.  Is he feeling any better?   Thank you,   GABRIELA Briscoe CNP on 5/8/2024 at 12:21 PM    A-(assessment):   Pt notes they still do not feel well. No change in condition.    Taking antibiotic as prescribed. No concerns.     R-(recommendations):   Lab appt scheduled for 05/13/2024.    Pt will wait for follow up CRP and advisement from provider.    Vesna Singh RN

## 2024-05-09 NOTE — TELEPHONE ENCOUNTER
RN attempted x 2 to contact patient.  TC- please reach out to patient and assist with scheduling (follow up labs).

## 2024-05-10 ENCOUNTER — DOCUMENTATION ONLY (OUTPATIENT)
Dept: FAMILY MEDICINE | Facility: CLINIC | Age: 59
End: 2024-05-10
Payer: MEDICAID

## 2024-05-10 DIAGNOSIS — S31.809A WOUND OF BUTTOCK, UNSPECIFIED LATERALITY, INITIAL ENCOUNTER: ICD-10-CM

## 2024-05-10 DIAGNOSIS — R79.82 ELEVATED C-REACTIVE PROTEIN (CRP): Primary | ICD-10-CM

## 2024-05-10 NOTE — PROGRESS NOTES
Patient has an upcoming lab appointment for a repeat CRP. I don't see any orders. If you would like this done please place orders. Thanks.